# Patient Record
Sex: MALE | Race: WHITE | NOT HISPANIC OR LATINO | Employment: FULL TIME | URBAN - METROPOLITAN AREA
[De-identification: names, ages, dates, MRNs, and addresses within clinical notes are randomized per-mention and may not be internally consistent; named-entity substitution may affect disease eponyms.]

---

## 2017-12-14 ENCOUNTER — TELEPHONE (OUTPATIENT)
Dept: FAMILY MEDICINE | Facility: CLINIC | Age: 32
End: 2017-12-14

## 2017-12-14 RX ORDER — OSELTAMIVIR PHOSPHATE 75 MG/1
75 CAPSULE ORAL DAILY
Qty: 10 CAPSULE | Refills: 0 | Status: SHIPPED | OUTPATIENT
Start: 2017-12-14 | End: 2017-12-24

## 2018-03-09 ENCOUNTER — OFFICE VISIT (OUTPATIENT)
Dept: FAMILY MEDICINE | Facility: CLINIC | Age: 33
End: 2018-03-09
Payer: COMMERCIAL

## 2018-03-09 VITALS
OXYGEN SATURATION: 97 % | DIASTOLIC BLOOD PRESSURE: 80 MMHG | HEART RATE: 87 BPM | HEIGHT: 69 IN | BODY MASS INDEX: 40.88 KG/M2 | WEIGHT: 276 LBS | SYSTOLIC BLOOD PRESSURE: 130 MMHG | TEMPERATURE: 98 F

## 2018-03-09 DIAGNOSIS — J00 COMMON COLD: Primary | ICD-10-CM

## 2018-03-09 PROCEDURE — 99999 PR PBB SHADOW E&M-EST. PATIENT-LVL III: CPT | Mod: PBBFAC,,, | Performed by: FAMILY MEDICINE

## 2018-03-09 PROCEDURE — 99203 OFFICE O/P NEW LOW 30 MIN: CPT | Mod: S$GLB,,, | Performed by: FAMILY MEDICINE

## 2018-03-09 RX ORDER — BENZONATATE 200 MG/1
200 CAPSULE ORAL 3 TIMES DAILY PRN
Qty: 30 CAPSULE | Refills: 0 | Status: SHIPPED | OUTPATIENT
Start: 2018-03-09 | End: 2018-03-19

## 2018-03-09 RX ORDER — FLUTICASONE PROPIONATE 50 MCG
2 SPRAY, SUSPENSION (ML) NASAL DAILY
Qty: 1 BOTTLE | Refills: 0 | Status: SHIPPED | OUTPATIENT
Start: 2018-03-09 | End: 2019-02-25

## 2018-03-09 NOTE — PROGRESS NOTES
"Subjective:       Patient ID: Bj Balbuena Jr. is a 32 y.o. male.    Chief Complaint: Nasal Congestion    HPI   32 year old male comes in complaining that he thinks he has flu like symptoms. He started having nasal congestion last night with runny nose. Had a cough, but that has resolved, had some shortness of breath but that has also resolved. He felt achy yesterday, but not really today. He missed work yesterday and today. He works as a contractor. He tried Dayquil with some relief.     Review of Systems   Constitutional: Positive for fatigue. Negative for chills and fever.   HENT: Positive for congestion, postnasal drip, rhinorrhea and sore throat. Negative for ear discharge and sinus pressure.    Eyes: Negative for discharge.   Respiratory: Negative for cough, shortness of breath and wheezing.    Cardiovascular: Negative for palpitations.   Gastrointestinal: Negative for abdominal pain, blood in stool, constipation and diarrhea.   Genitourinary: Negative for dysuria.   Skin: Negative for rash.       Objective:     /80   Pulse 87   Temp 98.1 °F (36.7 °C) (Oral)   Ht 5' 9" (1.753 m)   Wt 125.2 kg (276 lb 0.3 oz)   SpO2 97%   BMI 40.76 kg/m²     Physical Exam   Constitutional: He appears well-developed. He appears ill. No distress.   HENT:   Head: Normocephalic and atraumatic.   Right Ear: Tympanic membrane, external ear and ear canal normal.   Left Ear: Tympanic membrane, external ear and ear canal normal.   Nose: Mucosal edema and rhinorrhea present.  No foreign bodies. Right sinus exhibits no maxillary sinus tenderness. Left sinus exhibits no maxillary sinus tenderness.   Mouth/Throat: Posterior oropharyngeal erythema present.   Eyes: Conjunctivae, EOM and lids are normal. Pupils are equal, round, and reactive to light. Right eye exhibits no discharge. Left eye exhibits no discharge. No scleral icterus.   Neck: Trachea normal and normal range of motion. Neck supple. No tracheal tenderness " present. No tracheal deviation present. No thyroid mass present.   Cardiovascular: Normal rate, regular rhythm, normal heart sounds and intact distal pulses.    Pulmonary/Chest: Effort normal and breath sounds normal. No respiratory distress. He has no wheezes. He has no rales.   Lymphadenopathy:     He has no cervical adenopathy.   Psychiatric: He has a normal mood and affect. His behavior is normal.   Vitals reviewed.      Assessment:       1. Common cold        Plan:     Bj was seen today for nasal congestion.    Diagnoses and all orders for this visit:    Common cold  -     fluticasone (FLONASE) 50 mcg/actuation nasal spray; 2 sprays (100 mcg total) by Each Nare route once daily.  -     benzonatate (TESSALON) 200 MG capsule; Take 1 capsule (200 mg total) by mouth 3 (three) times daily as needed for Cough.  -     POCT Influenza A/B    Flu test negative in office.   Advised rest, fluids, and OTC ibuprofen.  Flonase and Tessalon for symptom relief.  Explained that after infections like these, people commonly have postviral coughing for several weeks.

## 2019-02-25 ENCOUNTER — OFFICE VISIT (OUTPATIENT)
Dept: FAMILY MEDICINE | Facility: CLINIC | Age: 34
End: 2019-02-25
Payer: COMMERCIAL

## 2019-02-25 VITALS
SYSTOLIC BLOOD PRESSURE: 124 MMHG | HEIGHT: 69 IN | DIASTOLIC BLOOD PRESSURE: 72 MMHG | WEIGHT: 261.94 LBS | HEART RATE: 78 BPM | OXYGEN SATURATION: 99 % | BODY MASS INDEX: 38.8 KG/M2 | TEMPERATURE: 97 F

## 2019-02-25 DIAGNOSIS — Z00.00 ROUTINE HEALTH MAINTENANCE: ICD-10-CM

## 2019-02-25 DIAGNOSIS — Z23 NEED FOR PROPHYLACTIC VACCINATION WITH TETANUS-DIPHTHERIA (TD): ICD-10-CM

## 2019-02-25 DIAGNOSIS — J06.9 VIRAL URI: Primary | ICD-10-CM

## 2019-02-25 PROCEDURE — 99999 PR PBB SHADOW E&M-EST. PATIENT-LVL III: ICD-10-PCS | Mod: PBBFAC,,, | Performed by: NURSE PRACTITIONER

## 2019-02-25 PROCEDURE — 90471 IMMUNIZATION ADMIN: CPT | Mod: S$GLB,,, | Performed by: NURSE PRACTITIONER

## 2019-02-25 PROCEDURE — 3008F PR BODY MASS INDEX (BMI) DOCUMENTED: ICD-10-PCS | Mod: CPTII,S$GLB,, | Performed by: NURSE PRACTITIONER

## 2019-02-25 PROCEDURE — 99214 OFFICE O/P EST MOD 30 MIN: CPT | Mod: 25,S$GLB,, | Performed by: NURSE PRACTITIONER

## 2019-02-25 PROCEDURE — 90714 TD VACC NO PRESV 7 YRS+ IM: CPT | Mod: S$GLB,,, | Performed by: NURSE PRACTITIONER

## 2019-02-25 PROCEDURE — 99999 PR PBB SHADOW E&M-EST. PATIENT-LVL III: CPT | Mod: PBBFAC,,, | Performed by: NURSE PRACTITIONER

## 2019-02-25 PROCEDURE — 99214 PR OFFICE/OUTPT VISIT, EST, LEVL IV, 30-39 MIN: ICD-10-PCS | Mod: 25,S$GLB,, | Performed by: NURSE PRACTITIONER

## 2019-02-25 PROCEDURE — 90714 TD VACCINE GREATER THAN OR EQUAL TO 7YO PRESERVATIVE FREE IM: ICD-10-PCS | Mod: S$GLB,,, | Performed by: NURSE PRACTITIONER

## 2019-02-25 PROCEDURE — 90471 TD VACCINE GREATER THAN OR EQUAL TO 7YO PRESERVATIVE FREE IM: ICD-10-PCS | Mod: S$GLB,,, | Performed by: NURSE PRACTITIONER

## 2019-02-25 PROCEDURE — 3008F BODY MASS INDEX DOCD: CPT | Mod: CPTII,S$GLB,, | Performed by: NURSE PRACTITIONER

## 2019-02-25 NOTE — LETTER
February 25, 2019      [facility]  [address]  [place] [address]  Phone: [phone]  Fax: [phone]       Patient: Bj Balbuena   YOB: 1985  Date of Visit: 02/25/2019    To Whom It May Concern:    Yahaira Balbuena  was at [facility] on 02/25/2019. He may return to work on 2/26/2019 with no restrictions. If you have any questions or concerns, or if I can be of further assistance, please do not hesitate to contact me.    Sincerely,    Magali Azar, DNP

## 2019-02-25 NOTE — PROGRESS NOTES
Subjective:       Patient ID: Bj Balbuena Jr. is a 33 y.o. male.    Chief Complaint: Nasal Congestion    Chief Complaint   Patient presents with    Nasal Congestion       HPI  Bj Balbuena Jr. is a 33 y.o. male with multiple medical diagnoses as listed in the medical history and problem list that presents for nasal congestion.  This patient is new to me. He is being seen today with his wife, who has been diagnosed with a viral URI. He has been working overtime for the last several weeks due to a project at his workplace, he works in shifts some days/nights. He is frequently tired. This project is coming to a close soon and he will be able to resume a regular schedule. He has been experiencing some nasal congestion since this morning, but states that it very mild and not bothersome. He denies fever, chills, body aches, nausea or vomiting.      PAST MEDICAL HISTORY:  History reviewed. No pertinent past medical history.    PAST SURGICAL HISTORY:  History reviewed. No pertinent surgical history.    SOCIAL HISTORY:  Social History     Socioeconomic History    Marital status:      Spouse name: Not on file    Number of children: Not on file    Years of education: Not on file    Highest education level: Not on file   Social Needs    Financial resource strain: Not on file    Food insecurity - worry: Not on file    Food insecurity - inability: Not on file    Transportation needs - medical: Not on file    Transportation needs - non-medical: Not on file   Occupational History    Not on file   Tobacco Use    Smoking status: Never Smoker    Smokeless tobacco: Never Used   Substance and Sexual Activity    Alcohol use: Yes     Alcohol/week: 1.8 oz     Types: 3 Glasses of wine per week     Comment: 3x week    Drug use: No    Sexual activity: Not on file   Other Topics Concern    Not on file   Social History Narrative    Not on file       FAMILY HISTORY:  History reviewed. No pertinent family  "history.    ALLERGIES AND MEDICATIONS: updated and reviewed.  Review of patient's allergies indicates:  No Known Allergies  No current outpatient medications on file.     No current facility-administered medications for this visit.          ROS  Review of Systems   Constitutional: Negative for activity change, appetite change, chills and fever.   HENT: Positive for congestion (mild, started today).    Eyes: Negative for pain, discharge and itching.   Respiratory: Negative for cough, shortness of breath and wheezing.    Cardiovascular: Negative for chest pain.   Gastrointestinal: Negative for abdominal pain.   Genitourinary: Negative for dysuria.   Neurological: Negative for headaches.   Psychiatric/Behavioral: Negative for behavioral problems and confusion.           Objective:     Physical Exam  Vitals:    02/25/19 1115   BP: 124/72   Pulse: 78   Temp: 97.2 °F (36.2 °C)   TempSrc: Oral   SpO2: 99%   Weight: 118.8 kg (261 lb 14.5 oz)   Height: 5' 9" (1.753 m)    Body mass index is 38.68 kg/m².  Weight: 118.8 kg (261 lb 14.5 oz)   Height: 5' 9" (175.3 cm)   Physical Exam   Constitutional: He is oriented to person, place, and time. He appears well-developed and well-nourished.   HENT:   Head: Normocephalic and atraumatic.   Right Ear: Tympanic membrane and ear canal normal.   Left Ear: Tympanic membrane and ear canal normal.   Nose: Mucosal edema and rhinorrhea present. Right sinus exhibits no maxillary sinus tenderness and no frontal sinus tenderness. Left sinus exhibits no maxillary sinus tenderness and no frontal sinus tenderness.   Mouth/Throat: No oropharyngeal exudate, posterior oropharyngeal edema or posterior oropharyngeal erythema.   Eyes: Conjunctivae and EOM are normal.   Neck: Normal range of motion. Neck supple.   Cardiovascular: Normal rate.   Pulmonary/Chest: Effort normal.   Musculoskeletal: He exhibits no edema.   Lymphadenopathy:     He has no cervical adenopathy.   Neurological: He is alert and " oriented to person, place, and time. No cranial nerve deficit.   Skin: Skin is warm and dry. No rash noted.   Vitals reviewed.        Assessment:     1. Viral URI    2. Routine health maintenance    3. Need for prophylactic vaccination with tetanus-diphtheria (Td)      Plan:     Bj was seen today for nasal congestion.    Diagnoses and all orders for this visit:    Viral URI        -     Recommended symptomatic care, including sinus rinse, OTC multi-symptom cold medicine, rest and hydration    Routine health maintenance  -     Lipid panel; Future  -     CBC auto differential; Future  -     Comprehensive metabolic panel; Future  -     TSH; Future  -     Will follow for results    Need for prophylactic vaccination with tetanus-diphtheria (Td)  -     (In Office Administered) Td Vaccine - Preservative Free  -     Administered      Health Maintenance       Date Due Completion Date    Lipid Panel 1983 ---    TETANUS VACCINE 09/20/2001 ---    Influenza Vaccine 08/01/2018 ---          Health Maintenance reviewed, see orders.    Follow-up: Follow-up in about 1 month (around 3/25/2019), or if symptoms worsen or fail to improve, for annual wellness visit.    The patient expressed understanding and no barriers to adherence were identified.     1. The patient indicates understanding of these issues and agrees with the plan. Brief care plan is updated and reviewed with the patient as applicable.     2. The patient is given an After Visit Summary that lists all medications with directions, allergies, orders placed during this encounter and follow-up instructions.     3. I have reviewed the patient's medical information including past medical, family, and social history sections including the medications and allergies.     4. We discussed the patient's current medications. I reconciled the patient's medication list and prepared and supplied needed refills.     Magali Azar, DNP, APRN, FNP-c  Family  Medicine    My collaborating physicians are Dr. Nico Liang and Dr. Ale Denise

## 2019-02-25 NOTE — PATIENT INSTRUCTIONS
Viral Upper Respiratory Illness (Adult)  You have a viral upper respiratory illness (URI), which is another term for the common cold. This illness is contagious during the first few days. It is spread through the air by coughing and sneezing. It may also be spread by direct contact (touching the sick person and then touching your own eyes, nose, or mouth). Frequent handwashing will decrease risk of spread. Most viral illnesses go away within 7 to 10 days with rest and simple home remedies. Sometimes the illness may last for several weeks. Antibiotics will not kill a virus, and they are generally not prescribed for this condition.    Home care  · If symptoms are severe, rest at home for the first 2 to 3 days. When you resume activity, don't let yourself get too tired.  · Avoid being exposed to cigarette smoke (yours or others).  · You may use acetaminophen or ibuprofen to control pain and fever, unless another medicine was prescribed. (Note: If you have chronic liver or kidney disease, have ever had a stomach ulcer or gastrointestinal bleeding, or are taking blood-thinning medicines, talk with your healthcare provider before using these medicines.) Aspirin should never be given to anyone under 18 years of age who is ill with a viral infection or fever. It may cause severe liver or brain damage.  · Your appetite may be poor, so a light diet is fine. Avoid dehydration by drinking 6 to 8 glasses of fluids per day (water, soft drinks, juices, tea, or soup). Extra fluids will help loosen secretions in the nose and lungs.  · Over-the-counter cold medicines will not shorten the length of time youre sick, but they may be helpful for the following symptoms: cough, sore throat, and nasal and sinus congestion. (Note: Do not use decongestants if you have high blood pressure.)  Follow-up care  Follow up with your healthcare provider, or as advised.  When to seek medical advice  Call your healthcare provider right away if any  of these occur:  · Cough with lots of colored sputum (mucus)  · Severe headache; face, neck, or ear pain  · Difficulty swallowing due to throat pain  · Fever of 100.4°F (38°C)  Call 911, or get immediate medical care  Call emergency services right away if any of these occur:  · Chest pain, shortness of breath, wheezing, or difficulty breathing  · Coughing up blood  · Inability to swallow due to throat pain  Date Last Reviewed: 9/13/2015  © 8776-1014 [employer] [address] [place] [address]. All rights reserved. This information is not intended as a substitute for professional medical care. Always follow your healthcare professional's instructions.

## 2019-03-25 ENCOUNTER — LAB VISIT (OUTPATIENT)
Dept: LAB | Facility: HOSPITAL | Age: 34
End: 2019-03-25
Attending: NURSE PRACTITIONER
Payer: COMMERCIAL

## 2019-03-25 DIAGNOSIS — Z00.00 ROUTINE HEALTH MAINTENANCE: ICD-10-CM

## 2019-03-25 LAB
ALBUMIN SERPL BCP-MCNC: 4 G/DL (ref 3.5–5.2)
ALP SERPL-CCNC: 74 U/L (ref 55–135)
ALT SERPL W/O P-5'-P-CCNC: 29 U/L (ref 10–44)
ANION GAP SERPL CALC-SCNC: 7 MMOL/L (ref 8–16)
AST SERPL-CCNC: 20 U/L (ref 10–40)
BASOPHILS # BLD AUTO: 0.05 K/UL (ref 0–0.2)
BASOPHILS NFR BLD: 0.6 % (ref 0–1.9)
BILIRUB SERPL-MCNC: 0.7 MG/DL (ref 0.1–1)
BUN SERPL-MCNC: 12 MG/DL (ref 6–20)
CALCIUM SERPL-MCNC: 9.5 MG/DL (ref 8.7–10.5)
CHLORIDE SERPL-SCNC: 104 MMOL/L (ref 95–110)
CHOLEST SERPL-MCNC: 177 MG/DL (ref 120–199)
CHOLEST/HDLC SERPL: 4.3 {RATIO} (ref 2–5)
CO2 SERPL-SCNC: 27 MMOL/L (ref 23–29)
CREAT SERPL-MCNC: 1 MG/DL (ref 0.5–1.4)
DIFFERENTIAL METHOD: NORMAL
EOSINOPHIL # BLD AUTO: 0.1 K/UL (ref 0–0.5)
EOSINOPHIL NFR BLD: 1.4 % (ref 0–8)
ERYTHROCYTE [DISTWIDTH] IN BLOOD BY AUTOMATED COUNT: 13 % (ref 11.5–14.5)
EST. GFR  (AFRICAN AMERICAN): >60 ML/MIN/1.73 M^2
EST. GFR  (NON AFRICAN AMERICAN): >60 ML/MIN/1.73 M^2
GLUCOSE SERPL-MCNC: 98 MG/DL (ref 70–110)
HCT VFR BLD AUTO: 42.7 % (ref 40–54)
HDLC SERPL-MCNC: 41 MG/DL (ref 40–75)
HDLC SERPL: 23.2 % (ref 20–50)
HGB BLD-MCNC: 14.6 G/DL (ref 14–18)
IMM GRANULOCYTES # BLD AUTO: 0.04 K/UL (ref 0–0.04)
IMM GRANULOCYTES NFR BLD AUTO: 0.5 % (ref 0–0.5)
LDLC SERPL CALC-MCNC: 93.2 MG/DL (ref 63–159)
LYMPHOCYTES # BLD AUTO: 2.2 K/UL (ref 1–4.8)
LYMPHOCYTES NFR BLD: 25.7 % (ref 18–48)
MCH RBC QN AUTO: 28.2 PG (ref 27–31)
MCHC RBC AUTO-ENTMCNC: 34.2 G/DL (ref 32–36)
MCV RBC AUTO: 83 FL (ref 82–98)
MONOCYTES # BLD AUTO: 0.7 K/UL (ref 0.3–1)
MONOCYTES NFR BLD: 8.3 % (ref 4–15)
NEUTROPHILS # BLD AUTO: 5.4 K/UL (ref 1.8–7.7)
NEUTROPHILS NFR BLD: 63.5 % (ref 38–73)
NONHDLC SERPL-MCNC: 136 MG/DL
NRBC BLD-RTO: 0 /100 WBC
PLATELET # BLD AUTO: 240 K/UL (ref 150–350)
PMV BLD AUTO: 11.6 FL (ref 9.2–12.9)
POTASSIUM SERPL-SCNC: 4 MMOL/L (ref 3.5–5.1)
PROT SERPL-MCNC: 7.3 G/DL (ref 6–8.4)
RBC # BLD AUTO: 5.17 M/UL (ref 4.6–6.2)
SODIUM SERPL-SCNC: 138 MMOL/L (ref 136–145)
TRIGL SERPL-MCNC: 214 MG/DL (ref 30–150)
TSH SERPL DL<=0.005 MIU/L-ACNC: 1.73 UIU/ML (ref 0.4–4)
WBC # BLD AUTO: 8.46 K/UL (ref 3.9–12.7)

## 2019-03-25 PROCEDURE — 85025 COMPLETE CBC W/AUTO DIFF WBC: CPT

## 2019-03-25 PROCEDURE — 84443 ASSAY THYROID STIM HORMONE: CPT

## 2019-03-25 PROCEDURE — 36415 COLL VENOUS BLD VENIPUNCTURE: CPT | Mod: PO

## 2019-03-25 PROCEDURE — 80053 COMPREHEN METABOLIC PANEL: CPT

## 2019-03-25 PROCEDURE — 80061 LIPID PANEL: CPT

## 2019-05-28 ENCOUNTER — LAB VISIT (OUTPATIENT)
Dept: LAB | Facility: HOSPITAL | Age: 34
End: 2019-05-28
Payer: COMMERCIAL

## 2019-05-28 ENCOUNTER — OFFICE VISIT (OUTPATIENT)
Dept: FAMILY MEDICINE | Facility: CLINIC | Age: 34
End: 2019-05-28
Payer: COMMERCIAL

## 2019-05-28 VITALS
HEIGHT: 69 IN | HEART RATE: 80 BPM | BODY MASS INDEX: 38.93 KG/M2 | OXYGEN SATURATION: 97 % | SYSTOLIC BLOOD PRESSURE: 116 MMHG | WEIGHT: 262.81 LBS | TEMPERATURE: 98 F | DIASTOLIC BLOOD PRESSURE: 68 MMHG

## 2019-05-28 DIAGNOSIS — R53.83 FATIGUE, UNSPECIFIED TYPE: Primary | ICD-10-CM

## 2019-05-28 DIAGNOSIS — R53.83 FATIGUE, UNSPECIFIED TYPE: ICD-10-CM

## 2019-05-28 LAB
25(OH)D3+25(OH)D2 SERPL-MCNC: 25 NG/ML (ref 30–96)
ALBUMIN SERPL BCP-MCNC: 4 G/DL (ref 3.5–5.2)
ALP SERPL-CCNC: 79 U/L (ref 55–135)
ALT SERPL W/O P-5'-P-CCNC: 25 U/L (ref 10–44)
ANION GAP SERPL CALC-SCNC: 9 MMOL/L (ref 8–16)
AST SERPL-CCNC: 18 U/L (ref 10–40)
BASOPHILS # BLD AUTO: 0.06 K/UL (ref 0–0.2)
BASOPHILS NFR BLD: 0.8 % (ref 0–1.9)
BILIRUB SERPL-MCNC: 0.4 MG/DL (ref 0.1–1)
BUN SERPL-MCNC: 11 MG/DL (ref 6–20)
CALCIUM SERPL-MCNC: 9.6 MG/DL (ref 8.7–10.5)
CHLORIDE SERPL-SCNC: 107 MMOL/L (ref 95–110)
CK SERPL-CCNC: 100 U/L (ref 20–200)
CO2 SERPL-SCNC: 25 MMOL/L (ref 23–29)
CREAT SERPL-MCNC: 1 MG/DL (ref 0.5–1.4)
DIFFERENTIAL METHOD: NORMAL
EOSINOPHIL # BLD AUTO: 0.1 K/UL (ref 0–0.5)
EOSINOPHIL NFR BLD: 1.1 % (ref 0–8)
ERYTHROCYTE [DISTWIDTH] IN BLOOD BY AUTOMATED COUNT: 12.8 % (ref 11.5–14.5)
EST. GFR  (AFRICAN AMERICAN): >60 ML/MIN/1.73 M^2
EST. GFR  (NON AFRICAN AMERICAN): >60 ML/MIN/1.73 M^2
FERRITIN SERPL-MCNC: 245 NG/ML (ref 20–300)
FOLATE SERPL-MCNC: 6.3 NG/ML (ref 4–24)
GLUCOSE SERPL-MCNC: 113 MG/DL (ref 70–110)
HCT VFR BLD AUTO: 45.1 % (ref 40–54)
HGB BLD-MCNC: 15.1 G/DL (ref 14–18)
IMM GRANULOCYTES # BLD AUTO: 0.02 K/UL (ref 0–0.04)
IMM GRANULOCYTES NFR BLD AUTO: 0.3 % (ref 0–0.5)
IRON SERPL-MCNC: 70 UG/DL (ref 45–160)
LYMPHOCYTES # BLD AUTO: 1.9 K/UL (ref 1–4.8)
LYMPHOCYTES NFR BLD: 25.4 % (ref 18–48)
MCH RBC QN AUTO: 28.1 PG (ref 27–31)
MCHC RBC AUTO-ENTMCNC: 33.5 G/DL (ref 32–36)
MCV RBC AUTO: 84 FL (ref 82–98)
MONOCYTES # BLD AUTO: 0.5 K/UL (ref 0.3–1)
MONOCYTES NFR BLD: 7 % (ref 4–15)
NEUTROPHILS # BLD AUTO: 4.9 K/UL (ref 1.8–7.7)
NEUTROPHILS NFR BLD: 65.4 % (ref 38–73)
NRBC BLD-RTO: 0 /100 WBC
PLATELET # BLD AUTO: 225 K/UL (ref 150–350)
PMV BLD AUTO: 11.8 FL (ref 9.2–12.9)
POTASSIUM SERPL-SCNC: 4.5 MMOL/L (ref 3.5–5.1)
PROT SERPL-MCNC: 7.5 G/DL (ref 6–8.4)
RBC # BLD AUTO: 5.38 M/UL (ref 4.6–6.2)
SATURATED IRON: 22 % (ref 20–50)
SODIUM SERPL-SCNC: 141 MMOL/L (ref 136–145)
TOTAL IRON BINDING CAPACITY: 312 UG/DL (ref 250–450)
TRANSFERRIN SERPL-MCNC: 211 MG/DL (ref 200–375)
TSH SERPL DL<=0.005 MIU/L-ACNC: 1.71 UIU/ML (ref 0.4–4)
VIT B12 SERPL-MCNC: 300 PG/ML (ref 210–950)
WBC # BLD AUTO: 7.47 K/UL (ref 3.9–12.7)

## 2019-05-28 PROCEDURE — 80053 COMPREHEN METABOLIC PANEL: CPT

## 2019-05-28 PROCEDURE — 85025 COMPLETE CBC W/AUTO DIFF WBC: CPT

## 2019-05-28 PROCEDURE — 82550 ASSAY OF CK (CPK): CPT

## 2019-05-28 PROCEDURE — 99214 OFFICE O/P EST MOD 30 MIN: CPT | Mod: S$GLB,,, | Performed by: NURSE PRACTITIONER

## 2019-05-28 PROCEDURE — 3008F PR BODY MASS INDEX (BMI) DOCUMENTED: ICD-10-PCS | Mod: CPTII,S$GLB,, | Performed by: NURSE PRACTITIONER

## 2019-05-28 PROCEDURE — 82306 VITAMIN D 25 HYDROXY: CPT

## 2019-05-28 PROCEDURE — 82746 ASSAY OF FOLIC ACID SERUM: CPT

## 2019-05-28 PROCEDURE — 36415 COLL VENOUS BLD VENIPUNCTURE: CPT | Mod: PO

## 2019-05-28 PROCEDURE — 99999 PR PBB SHADOW E&M-EST. PATIENT-LVL III: ICD-10-PCS | Mod: PBBFAC,,, | Performed by: NURSE PRACTITIONER

## 2019-05-28 PROCEDURE — 83540 ASSAY OF IRON: CPT

## 2019-05-28 PROCEDURE — 99214 PR OFFICE/OUTPT VISIT, EST, LEVL IV, 30-39 MIN: ICD-10-PCS | Mod: S$GLB,,, | Performed by: NURSE PRACTITIONER

## 2019-05-28 PROCEDURE — 3008F BODY MASS INDEX DOCD: CPT | Mod: CPTII,S$GLB,, | Performed by: NURSE PRACTITIONER

## 2019-05-28 PROCEDURE — 82607 VITAMIN B-12: CPT

## 2019-05-28 PROCEDURE — 84443 ASSAY THYROID STIM HORMONE: CPT

## 2019-05-28 PROCEDURE — 82728 ASSAY OF FERRITIN: CPT

## 2019-05-28 PROCEDURE — 99999 PR PBB SHADOW E&M-EST. PATIENT-LVL III: CPT | Mod: PBBFAC,,, | Performed by: NURSE PRACTITIONER

## 2019-05-28 NOTE — PROGRESS NOTES
Subjective:       Patient ID: Bj Balbuena Jr. is a 33 y.o. male.    Chief Complaint: Fatigue (2 months)    33-year-old male presents to the clinic today with fatigue for the last 2 months that has gotten worse over the last week.  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  He states he drinks about a quarts of water per day.  He denies any muscle or joint pain. He states he sweats a lot at work.  He has no family history of diabetes.  He denies any cardiac chest pain, heart palpitations, shortness of breath, or swelling to lower extremities.  He denies any headaches, dizziness, or blurred vision.    History reviewed. No pertinent past medical history.  History reviewed. No pertinent surgical history.   reports that he has never smoked. He has never used smokeless tobacco. He reports that he drinks about 1.8 oz of alcohol per week. He reports that he does not use drugs.  Review of Systems   Constitutional: Positive for fatigue. Negative for activity change.   Respiratory: Negative for cough, chest tightness, shortness of breath and wheezing.    Cardiovascular: Negative for chest pain, palpitations and leg swelling.   Gastrointestinal: Negative for abdominal pain, diarrhea, nausea and vomiting.   Musculoskeletal: Negative for back pain and gait problem.   Skin: Negative for color change.   Neurological: Negative for dizziness, light-headedness and headaches.       Objective:      Physical Exam   Constitutional: He is oriented to person, place, and time. He appears well-developed and well-nourished. No distress.   HENT:   Head: Normocephalic and atraumatic.   Right Ear: External ear normal.   Left Ear: External ear normal.   Nose: Nose normal.   Mouth/Throat: Oropharynx is clear and moist. No oropharyngeal exudate.   Eyes: Pupils are equal, round, and reactive to light. Conjunctivae and EOM are normal. Right eye exhibits no discharge. Left eye exhibits no discharge. No  scleral icterus.   Neck: Normal range of motion. Neck supple.   Cardiovascular: Normal rate and regular rhythm. Exam reveals no gallop and no friction rub.   No murmur heard.  Pulmonary/Chest: Effort normal and breath sounds normal. No respiratory distress. He has no wheezes. He has no rales.   Abdominal: Soft. Bowel sounds are normal. There is no tenderness.   Musculoskeletal: Normal range of motion. He exhibits no edema.   Lymphadenopathy:     He has no cervical adenopathy.   Neurological: He is alert and oriented to person, place, and time.   Skin: Skin is warm and dry. No rash noted. He is not diaphoretic.   Psychiatric: He has a normal mood and affect.       Assessment:       1. Fatigue, unspecified type        Plan:         Fatigue, unspecified type  -     CBC auto differential; Future; Expected date: 05/28/2019  -     Comprehensive metabolic panel; Future; Expected date: 05/28/2019  -     TSH; Future; Expected date: 05/28/2019  -     Vitamin D; Future; Expected date: 05/28/2019  -     CK; Future; Expected date: 05/28/2019  -     Vitamin B12; Future; Expected date: 05/28/2019  -     Iron and TIBC; Future; Expected date: 05/28/2019  -     Ferritin; Future; Expected date: 05/28/2019  -     Folate; Future; Expected date: 05/28/2019

## 2019-05-28 NOTE — LETTER
May 28, 2019      [facility]  [address]  [place] [address]  Phone: [phone]  Fax: [phone]       Patient: Bj Balbuena   YOB: 1985  Date of Visit: 05/28/2019    To Whom It May Concern:    Yahaira Balbuena  was at [facility] on 05/28/2019. He may return to work/school on 5/29/2019 with light duty restrictions for one day. If you have any questions or concerns, or if I can be of further assistance, please do not hesitate to contact me.    Sincerely,      Rosa Almodovar, ASTER-C

## 2019-05-29 ENCOUNTER — TELEPHONE (OUTPATIENT)
Dept: FAMILY MEDICINE | Facility: CLINIC | Age: 34
End: 2019-05-29

## 2019-05-29 NOTE — TELEPHONE ENCOUNTER
----- Message from Mamta Cullen sent at 5/29/2019 10:14 AM CDT -----  Contact: self [phone]  .Type:  Patient Returning Call    Who Called: self     Who Left Message for Patient: Monica Murrieta    Does the patient know what this is regarding?: results    Would the patient rather a call back or a response via [facility]? Call back     Best Call Back Number: [phone]

## 2019-05-29 NOTE — TELEPHONE ENCOUNTER
I spoke with the patient and explained that his vitamin d level was slightly low and his glucose was 113 otherwise everything was completely normal. I recommended he take Vitamin D 2,000 IU daily. He said he felt bad today at work and had to go home and he said something has to be wrong. I told him that I could schedule him a appointment with Dr. PHAM Maldonado tomorrow for further evaluation. He agreed to do that.

## 2019-05-29 NOTE — TELEPHONE ENCOUNTER
I left a message on the patient's voice mail to return a call to the office to discuss his lab results.

## 2019-05-30 ENCOUNTER — OFFICE VISIT (OUTPATIENT)
Dept: FAMILY MEDICINE | Facility: CLINIC | Age: 34
End: 2019-05-30
Payer: COMMERCIAL

## 2019-05-30 VITALS
BODY MASS INDEX: 39.23 KG/M2 | DIASTOLIC BLOOD PRESSURE: 86 MMHG | WEIGHT: 264.88 LBS | OXYGEN SATURATION: 97 % | SYSTOLIC BLOOD PRESSURE: 126 MMHG | TEMPERATURE: 98 F | HEIGHT: 69 IN | HEART RATE: 80 BPM

## 2019-05-30 DIAGNOSIS — R06.83 SNORING: Primary | ICD-10-CM

## 2019-05-30 PROCEDURE — 99999 PR PBB SHADOW E&M-EST. PATIENT-LVL III: CPT | Mod: PBBFAC,,, | Performed by: FAMILY MEDICINE

## 2019-05-30 PROCEDURE — 99214 PR OFFICE/OUTPT VISIT, EST, LEVL IV, 30-39 MIN: ICD-10-PCS | Mod: S$GLB,,, | Performed by: FAMILY MEDICINE

## 2019-05-30 PROCEDURE — 99999 PR PBB SHADOW E&M-EST. PATIENT-LVL III: ICD-10-PCS | Mod: PBBFAC,,, | Performed by: FAMILY MEDICINE

## 2019-05-30 PROCEDURE — 3008F BODY MASS INDEX DOCD: CPT | Mod: CPTII,S$GLB,, | Performed by: FAMILY MEDICINE

## 2019-05-30 PROCEDURE — 99214 OFFICE O/P EST MOD 30 MIN: CPT | Mod: S$GLB,,, | Performed by: FAMILY MEDICINE

## 2019-05-30 PROCEDURE — 3008F PR BODY MASS INDEX (BMI) DOCUMENTED: ICD-10-PCS | Mod: CPTII,S$GLB,, | Performed by: FAMILY MEDICINE

## 2019-05-30 NOTE — LETTER
May 30, 2019      [facility]  [address]  [place] [address]  Phone: [phone]  Fax: [phone]       Patient: Bj Balbuena   YOB: 1985  Date of Visit: 05/30/2019    To Whom It May Concern:    Yahaira Balbuena  was at [facility] on 05/30/2019. He may return to work on 06/03/2019 no restrictions. If you have any questions or concerns, or if I can be of further assistance, please do not hesitate to contact me.    Sincerely,          Alexis S. Barthelemy, MA

## 2019-05-30 NOTE — PROGRESS NOTES
"[facility]  Progress Note    SUBJECTIVE:     Chief Complaint   Patient presents with    Heat Exposure       HPI   Bj Balbuena Jr.  is a 33 y.o. male here for c/o having increasing heat intolerance. 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 Tries to drink plenty of water, but for the past couple weeks, has noticed that he is having trouble enduring the temperate. He does sweat significantly. When he feels "bad", has to take a break to sit in his car for A/C. He feels like he's having trouble cooling down. Today, feeling normal, back at baseline.     Review of patient's allergies indicates:  No Known Allergies    History reviewed. No pertinent past medical history.  History reviewed. No pertinent surgical history.  History reviewed. No pertinent family history.  Social History     Tobacco Use    Smoking status: Current Every Day Smoker     Types: Vaping w/o nicotine    Smokeless tobacco: Never Used   Substance Use Topics    Alcohol use: Yes     Alcohol/week: 1.8 oz     Types: 3 Glasses of wine per week     Comment: 3x week    Drug use: No        Review of Systems   Constitutional: Negative for chills, fever and malaise/fatigue.   HENT: Negative.    Respiratory: Negative.  Negative for cough and shortness of breath.    Cardiovascular: Negative.  Negative for chest pain.   Gastrointestinal: Negative.  Negative for abdominal pain, nausea and vomiting.   Genitourinary: Negative.    Neurological: Negative for weakness and headaches.   All other systems reviewed and are negative.    OBJECTIVE:     Vitals:    05/30/19 1106   BP: 126/86   Pulse: 80   Temp: 97.9 °F (36.6 °C)     Body mass index is 39.12 kg/m².    Physical Exam   Constitutional: He is oriented to person, place, and time and well-developed, well-nourished, and in no distress. No distress.   HENT:   Head: Normocephalic and atraumatic.   Nose: Nose normal.   Eyes: Conjunctivae and EOM are normal.   Cardiovascular: Normal rate, " regular rhythm and normal heart sounds. Exam reveals no gallop and no friction rub.   No murmur heard.  Pulmonary/Chest: Effort normal and breath sounds normal. No respiratory distress. He has no wheezes. He has no rales. He exhibits no tenderness.   Abdominal: Soft. Bowel sounds are normal. He exhibits no distension. There is no tenderness. There is no rebound.   Neurological: He is alert and oriented to person, place, and time.   Skin: Skin is warm. He is not diaphoretic.       Old records were reviewed. Labs and/or images were independently reviewed.    ASSESSMENT     1. Snoring        PLAN:     Snoring  -     Ambulatory referral to Sleep Disorders for possible sleep study. Discussed importance of staying hydrated in the heat weather, and to monitor for signs/symptoms of heat exhaustion/stroke as discussed.       RTC PRN  More than 50% of the encounter was spent counseling patient about heat exhaustion, in an outpatient setting. Total time spent counseling patient: 25.      Jim Maldonado MD  05/30/2019 12:45 PM

## 2019-06-06 ENCOUNTER — TELEPHONE (OUTPATIENT)
Dept: ADMINISTRATIVE | Facility: HOSPITAL | Age: 34
End: 2019-06-06

## 2019-07-07 ENCOUNTER — PATIENT OUTREACH (OUTPATIENT)
Dept: ADMINISTRATIVE | Facility: OTHER | Age: 34
End: 2019-07-07

## 2019-07-18 ENCOUNTER — TELEPHONE (OUTPATIENT)
Dept: PULMONOLOGY | Facility: CLINIC | Age: 34
End: 2019-07-18

## 2020-08-14 DIAGNOSIS — Z11.59 NEED FOR HEPATITIS C SCREENING TEST: ICD-10-CM

## 2020-09-30 ENCOUNTER — LAB VISIT (OUTPATIENT)
Dept: LAB | Facility: HOSPITAL | Age: 35
End: 2020-09-30
Attending: INTERNAL MEDICINE
Payer: COMMERCIAL

## 2020-09-30 ENCOUNTER — OFFICE VISIT (OUTPATIENT)
Dept: FAMILY MEDICINE | Facility: CLINIC | Age: 35
End: 2020-09-30
Payer: COMMERCIAL

## 2020-09-30 VITALS
DIASTOLIC BLOOD PRESSURE: 86 MMHG | TEMPERATURE: 100 F | HEIGHT: 69 IN | SYSTOLIC BLOOD PRESSURE: 132 MMHG | HEART RATE: 102 BPM | OXYGEN SATURATION: 98 % | WEIGHT: 304.56 LBS | BODY MASS INDEX: 45.11 KG/M2

## 2020-09-30 DIAGNOSIS — R20.0 RIGHT ARM NUMBNESS: ICD-10-CM

## 2020-09-30 DIAGNOSIS — Z13.1 SCREENING FOR DIABETES MELLITUS (DM): ICD-10-CM

## 2020-09-30 DIAGNOSIS — Z13.220 SCREENING FOR LIPID DISORDERS: ICD-10-CM

## 2020-09-30 DIAGNOSIS — G56.01 CARPAL TUNNEL SYNDROME OF RIGHT WRIST: Primary | ICD-10-CM

## 2020-09-30 DIAGNOSIS — G89.29 CHRONIC BILATERAL LOW BACK PAIN WITHOUT SCIATICA: ICD-10-CM

## 2020-09-30 DIAGNOSIS — E66.01 OBESITY, CLASS III, BMI 40-49.9 (MORBID OBESITY): ICD-10-CM

## 2020-09-30 DIAGNOSIS — M79.89 SWELLING OF LOWER EXTREMITY: ICD-10-CM

## 2020-09-30 DIAGNOSIS — M54.50 CHRONIC BILATERAL LOW BACK PAIN WITHOUT SCIATICA: ICD-10-CM

## 2020-09-30 PROBLEM — E66.813 OBESITY, CLASS III, BMI 40-49.9 (MORBID OBESITY): Status: ACTIVE | Noted: 2020-09-30

## 2020-09-30 LAB
ALBUMIN SERPL BCP-MCNC: 4.3 G/DL (ref 3.5–5.2)
ALP SERPL-CCNC: 82 U/L (ref 55–135)
ALT SERPL W/O P-5'-P-CCNC: 36 U/L (ref 10–44)
ANION GAP SERPL CALC-SCNC: 10 MMOL/L (ref 8–16)
AST SERPL-CCNC: 23 U/L (ref 10–40)
BASOPHILS # BLD AUTO: 0.05 K/UL (ref 0–0.2)
BASOPHILS NFR BLD: 0.5 % (ref 0–1.9)
BILIRUB SERPL-MCNC: 0.4 MG/DL (ref 0.1–1)
BUN SERPL-MCNC: 10 MG/DL (ref 6–20)
CALCIUM SERPL-MCNC: 9.7 MG/DL (ref 8.7–10.5)
CHLORIDE SERPL-SCNC: 103 MMOL/L (ref 95–110)
CHOLEST SERPL-MCNC: 187 MG/DL (ref 120–199)
CHOLEST/HDLC SERPL: 4.5 {RATIO} (ref 2–5)
CO2 SERPL-SCNC: 24 MMOL/L (ref 23–29)
CREAT SERPL-MCNC: 1 MG/DL (ref 0.5–1.4)
DIFFERENTIAL METHOD: ABNORMAL
EOSINOPHIL # BLD AUTO: 0.1 K/UL (ref 0–0.5)
EOSINOPHIL NFR BLD: 1 % (ref 0–8)
ERYTHROCYTE [DISTWIDTH] IN BLOOD BY AUTOMATED COUNT: 12.9 % (ref 11.5–14.5)
EST. GFR  (AFRICAN AMERICAN): >60 ML/MIN/1.73 M^2
EST. GFR  (NON AFRICAN AMERICAN): >60 ML/MIN/1.73 M^2
ESTIMATED AVG GLUCOSE: 117 MG/DL (ref 68–131)
GLUCOSE SERPL-MCNC: 115 MG/DL (ref 70–110)
HBA1C MFR BLD HPLC: 5.7 % (ref 4–5.6)
HCT VFR BLD AUTO: 45.7 % (ref 40–54)
HDLC SERPL-MCNC: 42 MG/DL (ref 40–75)
HDLC SERPL: 22.5 % (ref 20–50)
HGB BLD-MCNC: 14.7 G/DL (ref 14–18)
IMM GRANULOCYTES # BLD AUTO: 0.09 K/UL (ref 0–0.04)
IMM GRANULOCYTES NFR BLD AUTO: 0.9 % (ref 0–0.5)
LDLC SERPL CALC-MCNC: 102.8 MG/DL (ref 63–159)
LYMPHOCYTES # BLD AUTO: 2 K/UL (ref 1–4.8)
LYMPHOCYTES NFR BLD: 20 % (ref 18–48)
MCH RBC QN AUTO: 28.2 PG (ref 27–31)
MCHC RBC AUTO-ENTMCNC: 32.2 G/DL (ref 32–36)
MCV RBC AUTO: 88 FL (ref 82–98)
MONOCYTES # BLD AUTO: 0.6 K/UL (ref 0.3–1)
MONOCYTES NFR BLD: 6 % (ref 4–15)
NEUTROPHILS # BLD AUTO: 7.2 K/UL (ref 1.8–7.7)
NEUTROPHILS NFR BLD: 71.6 % (ref 38–73)
NONHDLC SERPL-MCNC: 145 MG/DL
NRBC BLD-RTO: 0 /100 WBC
PLATELET # BLD AUTO: 249 K/UL (ref 150–350)
PMV BLD AUTO: 11.7 FL (ref 9.2–12.9)
POTASSIUM SERPL-SCNC: 4.2 MMOL/L (ref 3.5–5.1)
PROT SERPL-MCNC: 8.1 G/DL (ref 6–8.4)
RBC # BLD AUTO: 5.22 M/UL (ref 4.6–6.2)
SODIUM SERPL-SCNC: 137 MMOL/L (ref 136–145)
TRIGL SERPL-MCNC: 211 MG/DL (ref 30–150)
WBC # BLD AUTO: 10.09 K/UL (ref 3.9–12.7)

## 2020-09-30 PROCEDURE — 99999 PR PBB SHADOW E&M-EST. PATIENT-LVL IV: CPT | Mod: PBBFAC,,, | Performed by: INTERNAL MEDICINE

## 2020-09-30 PROCEDURE — 99214 OFFICE O/P EST MOD 30 MIN: CPT | Mod: S$GLB,,, | Performed by: INTERNAL MEDICINE

## 2020-09-30 PROCEDURE — 3008F PR BODY MASS INDEX (BMI) DOCUMENTED: ICD-10-PCS | Mod: CPTII,S$GLB,, | Performed by: INTERNAL MEDICINE

## 2020-09-30 PROCEDURE — 83036 HEMOGLOBIN GLYCOSYLATED A1C: CPT

## 2020-09-30 PROCEDURE — 36415 COLL VENOUS BLD VENIPUNCTURE: CPT | Mod: PO

## 2020-09-30 PROCEDURE — 99999 PR PBB SHADOW E&M-EST. PATIENT-LVL IV: ICD-10-PCS | Mod: PBBFAC,,, | Performed by: INTERNAL MEDICINE

## 2020-09-30 PROCEDURE — 80053 COMPREHEN METABOLIC PANEL: CPT

## 2020-09-30 PROCEDURE — 80061 LIPID PANEL: CPT

## 2020-09-30 PROCEDURE — 99214 PR OFFICE/OUTPT VISIT, EST, LEVL IV, 30-39 MIN: ICD-10-PCS | Mod: S$GLB,,, | Performed by: INTERNAL MEDICINE

## 2020-09-30 PROCEDURE — 85025 COMPLETE CBC W/AUTO DIFF WBC: CPT

## 2020-09-30 PROCEDURE — 3008F BODY MASS INDEX DOCD: CPT | Mod: CPTII,S$GLB,, | Performed by: INTERNAL MEDICINE

## 2020-09-30 NOTE — PROGRESS NOTES
Subjective:       Chief Complaint  Chief Complaint   Patient presents with    Back Pain     x 10 years     numbness in rt arm       HPI  Bj Balbuena Jr. is a 35 y.o. male with multiple medical diagnoses as listed in the medical history and problem list that presents for above complaint(s).    Back Pain  Associated symptoms include numbness.   patient works as a  - has been furloughed since earlier this year due to pandemic  Pain in his back for some time - Notices that he had to take breaks between activity for projects and pain located predominantly in the lumbar spine  previousyl had been referred to a Pain Management in [place] - determined that he had a problem between his sciatic nerve and lumbar disc  Has had a few cortisone injections  Symptoms are frequent   Not taking anything for pain  Gabapentin used previously and caused severe side effects  No leg symptoms    Additionally he has noticed some numbness in his right hand   No neck pain symptoms presently     Patient Care Team:  Stevie Saldana MD as PCP - General (Internal Medicine)  Kami Valera MA as Care Coordinator      PAST MEDICAL HISTORY:  History reviewed. No pertinent past medical history.    PAST SURGICAL HISTORY:  History reviewed. No pertinent surgical history.    SOCIAL HISTORY:  Social History     Socioeconomic History    Marital status:      Spouse name: Not on file    Number of children: Not on file    Years of education: Not on file    Highest education level: Not on file   Occupational History    Not on file   Social Needs    Financial resource strain: Not on file    Food insecurity     Worry: Not on file     Inability: Not on file    Transportation needs     Medical: Not on file     Non-medical: Not on file   Tobacco Use    Smoking status: Current Some Day Smoker     Types: Vaping w/o nicotine    Smokeless tobacco: Never Used   Substance and Sexual Activity    Alcohol use: Yes     Alcohol/week:  "3.0 standard drinks     Types: 3 Glasses of wine per week     Comment: 3x week    Drug use: No    Sexual activity: Not on file   Lifestyle    Physical activity     Days per week: Not on file     Minutes per session: Not on file    Stress: Not on file   Relationships    Social connections     Talks on phone: Not on file     Gets together: Not on file     Attends Church service: Not on file     Active member of club or organization: Not on file     Attends meetings of clubs or organizations: Not on file     Relationship status: Not on file   Other Topics Concern    Not on file   Social History Narrative    Not on file       FAMILY HISTORY:  History reviewed. No pertinent family history.    ALLERGIES AND MEDICATIONS: updated and reviewed.  Review of patient's allergies indicates:  No Known Allergies  No current outpatient medications on file.     No current facility-administered medications for this visit.          ROS  Review of Systems   Cardiovascular: Positive for leg swelling.   Musculoskeletal: Positive for back pain.   Neurological: Positive for numbness.   All other systems reviewed and are negative.        Objective:       Physical Exam  Vitals:    09/30/20 1403   BP: 132/86   BP Location: Left arm   Patient Position: Sitting   BP Method: Large (Manual)   Pulse: 102   Temp: 99.5 °F (37.5 °C)   TempSrc: Oral   SpO2: 98%   Weight: (!) 138.2 kg (304 lb 9.1 oz)   Height: 5' 9" (1.753 m)    Body mass index is 44.98 kg/m².  Weight: (!) 138.2 kg (304 lb 9.1 oz)   Height: 5' 9" (175.3 cm)   Physical Exam  Vitals signs reviewed.   Constitutional:       General: He is not in acute distress.     Appearance: He is well-developed.   HENT:      Head: Normocephalic and atraumatic.      Right Ear: External ear normal.      Left Ear: External ear normal.      Nose: Nose normal.   Eyes:      Pupils: Pupils are equal, round, and reactive to light.   Neck:      Musculoskeletal: Normal range of motion and neck supple.    "   Thyroid: No thyromegaly.   Cardiovascular:      Rate and Rhythm: Normal rate.      Pulses: Normal pulses.   Pulmonary:      Effort: Pulmonary effort is normal.   Musculoskeletal: Normal range of motion.         General: No tenderness.      Right lower leg: Edema (trace) present.      Left lower leg: Edema (trace) present.   Skin:     General: Skin is warm and dry.      Findings: No erythema or rash.   Neurological:      Mental Status: He is alert.      Cranial Nerves: No cranial nerve deficit.      Comments: Positive Tinel's of R hand/wrist   Psychiatric:         Behavior: Behavior normal.             Assessment:     1. Carpal tunnel syndrome of right wrist    2. Right arm numbness    3. Chronic bilateral low back pain without sciatica    4. Obesity, Class III, BMI 40-49.9 (morbid obesity)    5. Swelling of lower extremity    6. Screening for lipid disorders    7. Screening for diabetes mellitus (DM)      Plan:     Bj was seen today for back pain and numbness in rt arm.    Diagnoses and all orders for this visit:    Carpal tunnel syndrome of right wrist  Right arm numbness  Discussed mild symptoms  Utilize wrist brace/support presently  Consider OT if symptoms refractory    Chronic bilateral low back pain without sciatica  No radiculopathy or worrisome findings  Imaging not recommended   Discussed PT   -     Ambulatory referral/consult to Physical/Occupational Therapy; Future  -     CBC auto differential; Future  -     Comprehensive metabolic panel; Future    Obesity, Class III, BMI 40-49.9 (morbid obesity)    Screening for lipid disorders  Screening for diabetes mellitus (DM)  Body mass index is 44.98 kg/m².  We discussed dietary interventions for the management of weight and reduction of risk for chronic metabolic disease  Metabolic labs  -     Lipid Panel; Future  -     Hemoglobin A1C; Future      Swelling of lower extremity  Consider venous insufficiency  Monitor presently        Health Maintenance        Date Due Completion Date    Hepatitis C Screening 1985 ---    Pneumococcal Vaccine (Medium Risk) (1 of 1 - PPSV23) 09/20/2002 ---    Influenza Vaccine (1) 08/01/2020 ---    Lipid Panel 03/25/2024 3/25/2019    TETANUS VACCINE 02/25/2029 2/25/2019            Health Maintenance reviewed, addressed as per orders    Follow up in about 4 weeks (around 10/28/2020) for [facility].    The patient expressed understanding and no barriers to adherence were identified.     1. The patient indicates understanding of these issues and agrees with the plan. Brief care plan is updated and reviewed with the patient as applicable.     2. The patient is given an After Visit Summary that lists all medications with directions, allergies, orders placed during this encounter and follow-up instructions.     3. I have reviewed the patient's medical information including past medical, family, and social history sections including the medications and allergies.     4. We discussed the patient's current medications. I reconciled the patient's medication list and prepared and supplied needed refills.       Stevie Saldana MD  Internal Medicine-Pediatrics

## 2020-09-30 NOTE — PATIENT INSTRUCTIONS
It was a pleasure meeting you today. Myself or a member of my staff will contact you with your lab results    Please call  [phone] to schedule an appointment with Physical/Occupational Therapy ([facility] [address])

## 2020-10-01 PROBLEM — R73.03 PREDIABETES: Status: ACTIVE | Noted: 2020-10-01

## 2020-10-15 ENCOUNTER — CLINICAL SUPPORT (OUTPATIENT)
Dept: REHABILITATION | Facility: HOSPITAL | Age: 35
End: 2020-10-15
Attending: INTERNAL MEDICINE
Payer: COMMERCIAL

## 2020-10-15 DIAGNOSIS — M54.50 CHRONIC BILATERAL LOW BACK PAIN WITHOUT SCIATICA: ICD-10-CM

## 2020-10-15 DIAGNOSIS — G89.29 CHRONIC BILATERAL LOW BACK PAIN WITHOUT SCIATICA: ICD-10-CM

## 2020-10-15 PROCEDURE — 97110 THERAPEUTIC EXERCISES: CPT

## 2020-10-15 PROCEDURE — 97161 PT EVAL LOW COMPLEX 20 MIN: CPT

## 2020-10-15 NOTE — PROGRESS NOTES
[facility]  Physical Therapy Initial Evaluation    Name: Bj Balbuena Jr.  Clinic Number: [id number]    Therapy Diagnosis:   Encounter Diagnosis   Name Primary?    Chronic bilateral low back pain without sciatica      Physician: Stevie Saldana MD    Physician Orders: PT Eval and Treat   Medical Diagnosis from Referral: chronic low back pain  Evaluation Date: 10/15/2020  Authorization Period Expiration: 12/31/20  Plan of Care Expiration: 1/20  Visit # / Visits authorized: 1/20    Time In: 1:35  Time Out: 2:15  Total Billable Time: 40 minutes    Precautions: Standard    Subjective   Date of onset: >3 years  History of current condition - Bj reports: he is having low back pain for years, recently has got worse. Pt states he has been furloughed since march. Pt states he has been home a lot and is having pain with simple tasks.       Medical History:   No past medical history on file.    Surgical History:   Bj Balbuena Jr.  has no past surgical history on file.    Medications:   Bj currently has no medications in their medication list.    Allergies:   Review of patient's allergies indicates:  No Known Allergies     Imaging, none    Prior Therapy: denies  Occupation: Previously construction work  Prior Level of Function: Limited with pain  Current Level of Function: Limited with pain    Pain:  Current 2/10, worst 6/10, best 2/10   Location: bilateral , midline low back  Description: Tight,burning  Aggravating Factors: flexing trunk, walking prolonged distances  Easing Factors: resting    Pts goals: Decrease low back pain with ADLs    Objective     Observation: decrease lumbopelvic movement with ambulation      Lumbar Range of Motion:  Percen   Degrees Pain   Flexion 50   +        Extension 50   +        Left Side Bending 80       Right Side Bending 75         Left rotation   80       Right Rotation   70              Lower Extremity Strength  Right LE  Left LE    Knee  extension (L3): 4+/5 Knee extension (L3): 4+/5   Knee flexion: 4+/5 Knee flexion: 4+/5   Hip flexion (L2): 4+/5 Hip flexion (L2): 4+/5   Hip extension:  4/5 Hip extension: 4/5   Hip abduction: 4+/5 Hip abduction: 4-/5   Hip adduction: 4+/5 Hip adduction: 4+/5   Ankle dorsiflexion (L4): 4+/5 Ankle dorsiflexion (L4): 4+/5         Special Tests:      DTR:   Right Left Comment   Patellar (L3-4) 2+ 2+    Achilles (S1) 2+ 2+        Neuro Dynamic Testing:    Sciatic nerve:      SLR: R = 35 deg     L = 35 deg   Dural slump test:    R = neg    L = neg    Femoral Nerve:    Femoral nerve test: N/T      Joint Mobility:  T11-T12,L1, L2, L3 P/A grade III normal end feel with pain, L4/5 P/A grade III normal end feel some pain.     Palpation: Significant tenderness along T11-L3 paraspinal musculature, gut med tighness/tenderness     Sensation: B intact lower extremity light touch     Flexibility:     Significantly tight bilateral hamstring/adductors         CMS Impairment/Limitation/Restriction for FOTO Oswestry Survey    Therapist reviewed FOTO scores for Bj Balbuena Jr. on 10/15/2020.   FOTO documents entered into [employer] - see Media section.    Limitation Score: 37.9%           TREATMENT   Treatment Time In: 1:35  Treatment Time Out: 2:15  Total Treatment time separate from Evaluation: 40 minutes    Bj received therapeutic exercises to develop strength, ROM and core stabilization for 15 minutes including:  Seated HS stretch 3X30 sec  Active HS stretch 3X30 sec  PPT with TA bracing 20X 2 sec  Piriformis stretch modified 3X30 sec  SKTC 3X30 sec  LTR 20X      Home Exercises and Patient Education Provided    Education provided:   - Role of PT, PT POC    Written Home Exercises Provided: yes.  Exercises were reviewed and Bj was able to demonstrate them prior to the end of the session.  Bj demonstrated good  understanding of the education provided.         Assessment   Bj is a 35 y.o. male referred to outpatient  Physical Therapy with a medical diagnosis of chronic low back pain without sciatica.  Pt presents to physical therapy with the following impairments: postural dysfunction, decreased core and glut max strength, decreased lumbar and LE flexibility and mobility, impaired neuromuscular control of core and hip musculature and pain with functional activities. Pt also demonstrates pain with lower thoracic/lumbar vertebral mobilizations.. Pt is a good candidate for skilled PT services at this time and stands to benefit from a combination of manual therapy including joint mobilizations with trigger point/myofacscial release, therapeutic exercise to establish core/joint stability, neuromuscular re-education, and modalities Prn. Pt has set realistic goals and has verbalized good understanding and agreement with her diagnosis, prognosis and treatment.     Pt prognosis is Good.   Pt will benefit from skilled outpatient Physical Therapy to address the deficits stated above and in the chart below, provide pt/family education, and to maximize pt's level of independence.     Plan of care discussed with patient: Yes  Pt's spiritual, cultural and educational needs considered and patient is agreeable to the plan of care and goals as stated below:     Anticipated Barriers for therapy: none    Medical Necessity is demonstrated by the following  History  Co-morbidities and personal factors that may impact the plan of care Co-morbidities:   Obesity, pre-diabetic    Personal Factors:   no deficits     low   Examination  Body Structures and Functions, activity limitations and participation restrictions that may impact the plan of care Body Regions:   trunk    Body Systems:    ROM  strength  transitions    Participation Restrictions:       Activity limitations:   Learning and applying knowledge  no deficits    General Tasks and Commands  no deficits    Communication  no deficits    Mobility  lifting and carrying objects  walking  moving  around using equipment (WC)  driving (bike, car, motorcycle)    Self care  no deficits    Domestic Life  shopping  cooking  doing house work (cleaning house, washing dishes, laundry)    Interactions/Relationships  no deficits    Life Areas  no deficits    Community and Social Life  no deficits         low   Clinical Presentation stable and uncomplicated low   Decision Making/ Complexity Score: low     Goals:   Short Term Goals (4 Weeks):   1. Pt to achieve minimally clinical important difference in functional outcome measure by re-assessment to demonstrate decrease disability with walking and moving around.  2. Pt to increase strength to 4+/5 of  Glut max muscles  to allow for improvement in functional activities such as performing chores.  3. Pt to improve gross spinal motion in flexion by 25% to allow for improved functional mobility.  4. Pt to tolerate standing for >1 hour with <2/10 pain in low back to allow for improvement in IADLs.  5. Pt to report compliance with HEP 3x/week and demonstrate proper exercise technique to PT to show competence with self management of condition.   6. Pt to demonstrate improved SLR to 50 deg bilateraly    Long Term Goals (8 Weeks):   1. Pt to achieve <20% limitation as measured by the FOTO to demonstrate decreased low back pain disability.  2. Pt to increase strength to 5/5 of  B Glut max to allow for improvement in functional activities such as performing chores.  3. Pt to improve gross spinal motion to 0% limitations to allow for improved functional mobility with performing ADL's  4. Pt to tolerate standing and walking for community distances with <2/10 pain in low back to improve mobility with IADL's.   5. Pt to report compliance with HEP 3x/week and demonstrate proper exercise technique to PT to show independence with self management of condition.      Plan   Plan of care Certification: 10/15/2020 to 12/15/20    Outpatient Physical Therapy 1 times weekly for 8 weeks to  include the following interventions: Manual Therapy, Neuromuscular Re-ed, Patient Education, Therapeutic Activites and Therapeutic Exercise. , Sulaiman Barnard, PT, DPT  10/15/2020

## 2020-10-16 NOTE — PLAN OF CARE
[facility]  Physical Therapy Initial Evaluation    Name: Bj Balbuena Jr.  Clinic Number: [id number]    Therapy Diagnosis:   Encounter Diagnosis   Name Primary?    Chronic bilateral low back pain without sciatica      Physician: Stevie Saldana MD    Physician Orders: PT Eval and Treat   Medical Diagnosis from Referral: chronic low back pain  Evaluation Date: 10/15/2020  Authorization Period Expiration: 12/31/20  Plan of Care Expiration: 1/20  Visit # / Visits authorized: 1/20    Time In: 1:35  Time Out: 2:15  Total Billable Time: 40 minutes    Precautions: Standard    Subjective   Date of onset: >3 years  History of current condition - jB reports: he is having low back pain for years, recently has got worse. Pt states he has been furloughed since march. Pt states he has been home a lot and is having pain with simple tasks.       Medical History:   No past medical history on file.    Surgical History:   Bj Balbuena Jr.  has no past surgical history on file.    Medications:   Bj currently has no medications in their medication list.    Allergies:   Review of patient's allergies indicates:  No Known Allergies     Imaging, none    Prior Therapy: denies  Occupation: Previously construction work  Prior Level of Function: Limited with pain  Current Level of Function: Limited with pain    Pain:  Current 2/10, worst 6/10, best 2/10   Location: bilateral , midline low back  Description: Tight,burning  Aggravating Factors: flexing trunk, walking prolonged distances  Easing Factors: resting    Pts goals: Decrease low back pain with ADLs    Objective     Observation: decrease lumbopelvic movement with ambulation      Lumbar Range of Motion:  Percen   Degrees Pain   Flexion 50   +        Extension 50   +        Left Side Bending 80       Right Side Bending 75         Left rotation   80       Right Rotation   70              Lower Extremity Strength  Right LE  Left LE    Knee  extension (L3): 4+/5 Knee extension (L3): 4+/5   Knee flexion: 4+/5 Knee flexion: 4+/5   Hip flexion (L2): 4+/5 Hip flexion (L2): 4+/5   Hip extension:  4/5 Hip extension: 4/5   Hip abduction: 4+/5 Hip abduction: 4-/5   Hip adduction: 4+/5 Hip adduction: 4+/5   Ankle dorsiflexion (L4): 4+/5 Ankle dorsiflexion (L4): 4+/5         Special Tests:      DTR:   Right Left Comment   Patellar (L3-4) 2+ 2+    Achilles (S1) 2+ 2+        Neuro Dynamic Testing:    Sciatic nerve:      SLR: R = 35 deg     L = 35 deg   Dural slump test:    R = neg    L = neg    Femoral Nerve:    Femoral nerve test: N/T      Joint Mobility:  T11-T12,L1, L2, L3 P/A grade III normal end feel with pain, L4/5 P/A grade III normal end feel some pain.     Palpation: Significant tenderness along T11-L3 paraspinal musculature, gut med tighness/tenderness     Sensation: B intact lower extremity light touch     Flexibility:     Significantly tight bilateral hamstring/adductors         CMS Impairment/Limitation/Restriction for FOTO Oswestry Survey    Therapist reviewed FOTO scores for Bj Balbuena Jr. on 10/15/2020.   FOTO documents entered into [employer] - see Media section.    Limitation Score: 37.9%           TREATMENT   Treatment Time In: 1:35  Treatment Time Out: 2:15  Total Treatment time separate from Evaluation: 40 minutes    Bj received therapeutic exercises to develop strength, ROM and core stabilization for 15 minutes including:  Seated HS stretch 3X30 sec  Active HS stretch 3X30 sec  PPT with TA bracing 20X 2 sec  Piriformis stretch modified 3X30 sec  SKTC 3X30 sec  LTR 20X      Home Exercises and Patient Education Provided    Education provided:   - Role of PT, PT POC    Written Home Exercises Provided: yes.  Exercises were reviewed and Bj was able to demonstrate them prior to the end of the session.  Bj demonstrated good  understanding of the education provided.         Assessment   Bj is a 35 y.o. male referred to outpatient  Physical Therapy with a medical diagnosis of chronic low back pain without sciatica.  Pt presents to physical therapy with the following impairments: postural dysfunction, decreased core and glut max strength, decreased lumbar and LE flexibility and mobility, impaired neuromuscular control of core and hip musculature and pain with functional activities. Pt also demonstrates pain with lower thoracic/lumbar vertebral mobilizations.. Pt is a good candidate for skilled PT services at this time and stands to benefit from a combination of manual therapy including joint mobilizations with trigger point/myofacscial release, therapeutic exercise to establish core/joint stability, neuromuscular re-education, and modalities Prn. Pt has set realistic goals and has verbalized good understanding and agreement with her diagnosis, prognosis and treatment.     Pt prognosis is Good.   Pt will benefit from skilled outpatient Physical Therapy to address the deficits stated above and in the chart below, provide pt/family education, and to maximize pt's level of independence.     Plan of care discussed with patient: Yes  Pt's spiritual, cultural and educational needs considered and patient is agreeable to the plan of care and goals as stated below:     Anticipated Barriers for therapy: none    Medical Necessity is demonstrated by the following  History  Co-morbidities and personal factors that may impact the plan of care Co-morbidities:   Obesity, pre-diabetic    Personal Factors:   no deficits     low   Examination  Body Structures and Functions, activity limitations and participation restrictions that may impact the plan of care Body Regions:   trunk    Body Systems:    ROM  strength  transitions    Participation Restrictions:       Activity limitations:   Learning and applying knowledge  no deficits    General Tasks and Commands  no deficits    Communication  no deficits    Mobility  lifting and carrying objects  walking  moving  around using equipment (WC)  driving (bike, car, motorcycle)    Self care  no deficits    Domestic Life  shopping  cooking  doing house work (cleaning house, washing dishes, laundry)    Interactions/Relationships  no deficits    Life Areas  no deficits    Community and Social Life  no deficits         low   Clinical Presentation stable and uncomplicated low   Decision Making/ Complexity Score: low     Goals:   Short Term Goals (4 Weeks):   1. Pt to achieve minimally clinical important difference in functional outcome measure by re-assessment to demonstrate decrease disability with walking and moving around.  2. Pt to increase strength to 4+/5 of  Glut max muscles  to allow for improvement in functional activities such as performing chores.  3. Pt to improve gross spinal motion in flexion by 25% to allow for improved functional mobility.  4. Pt to tolerate standing for >1 hour with <2/10 pain in low back to allow for improvement in IADLs.  5. Pt to report compliance with HEP 3x/week and demonstrate proper exercise technique to PT to show competence with self management of condition.   6. Pt to demonstrate improved SLR to 50 deg bilateraly    Long Term Goals (8 Weeks):   1. Pt to achieve <20% limitation as measured by the FOTO to demonstrate decreased low back pain disability.  2. Pt to increase strength to 5/5 of  B Glut max to allow for improvement in functional activities such as performing chores.  3. Pt to improve gross spinal motion to 0% limitations to allow for improved functional mobility with performing ADL's  4. Pt to tolerate standing and walking for community distances with <2/10 pain in low back to improve mobility with IADL's.   5. Pt to report compliance with HEP 3x/week and demonstrate proper exercise technique to PT to show independence with self management of condition.      Plan   Plan of care Certification: 10/15/2020 to 12/15/20    Outpatient Physical Therapy 1 times weekly for 8 weeks to  include the following interventions: Manual Therapy, Neuromuscular Re-ed, Patient Education, Therapeutic Activites and Therapeutic Exercise. , Sulaiman Barnard, PT, DPT  10/15/2020

## 2020-10-29 ENCOUNTER — CLINICAL SUPPORT (OUTPATIENT)
Dept: REHABILITATION | Facility: HOSPITAL | Age: 35
End: 2020-10-29
Attending: INTERNAL MEDICINE
Payer: COMMERCIAL

## 2020-10-29 DIAGNOSIS — G89.29 CHRONIC BILATERAL LOW BACK PAIN WITHOUT SCIATICA: Primary | ICD-10-CM

## 2020-10-29 DIAGNOSIS — M54.50 CHRONIC BILATERAL LOW BACK PAIN WITHOUT SCIATICA: Primary | ICD-10-CM

## 2020-10-29 PROCEDURE — 97140 MANUAL THERAPY 1/> REGIONS: CPT

## 2020-10-29 PROCEDURE — 97110 THERAPEUTIC EXERCISES: CPT

## 2020-10-29 NOTE — PROGRESS NOTES
Physical Therapy Daily Treatment Note     Name: Bj Balbuena Jr.  Clinic Number: [id number]    Therapy Diagnosis:   Encounter Diagnosis   Name Primary?    Chronic bilateral low back pain without sciatica Yes     Physician: Stevie Saldana MD    Visit Date: 10/29/2020  Physician Orders: PT Eval and Treat   Medical Diagnosis from Referral: chronic low back pain  Evaluation Date: 10/15/2020  Authorization Period Expiration: 12/31/20  Plan of Care Expiration: 1/21  Visit # / Visits authorized: 2/20    Time In: 12:15 p  Time Out: 12:45 p  Total Billable Time: 30 minutes    Precautions: Standard    Subjective     Pt reports: reports he is doing well. Pt states he was working on a project for his kids and back started aching. Pt states its feeling somewhat better now.   He was compliant with home exercise program.  Response to previous treatment: n/a  Functional change: n/a    Pain: 3/10  Location: low back     Objective     Bj received therapeutic exercises to develop ROM, flexibility and core stabilization for 20 minutes including:  Bike 5 min  Seated HS stretch 3X30 sec  Active HS stretch 3X30 sec  PPT with TA bracing 20X 2 sec  Piriformis stretch modified 3X30 sec  SKTC 3X30 sec  LTR  with SB 3'    Bj received the following manual therapy techniques: Joint mobilizations and Soft tissue Mobilization were applied for 10 minutes, including:  B QL trp release.    Bj participated in neuromuscular re-education activities to improve: for 00 minutes. The following activities were included:      Bj participated in dynamic functional therapeutic activities to improve functional performance for 00  minutes, including:      Bj participated in gait training to improve functional mobility and safety for 00  minutes, including:    Bj received hot pack for 10 minutes to increase circulation and promote tissue healing.      Home Exercises Provided and Patient Education Provided     Education  provided:   -     Written Home Exercises Provided: yes.  Exercises were reviewed and Bj was able to demonstrate them prior to the end of the session.  Bj demonstrated good  understanding of the education provided.       Assessment     Pt tolerated tx well today, presents with neutral pelvic alignment. Pt exhibits significant QL tightness. Good response to MT with improve soft tissue mobility of QL.   Bj is progressing well towards his goals.   Pt prognosis is Good.     Pt will continue to benefit from skilled outpatient physical therapy to address the deficits listed in the problem list box on initial evaluation, provide pt/family education and to maximize pt's level of independence in the home and community environment.     Pt's spiritual, cultural and educational needs considered and pt agreeable to plan of care and goals.    Anticipated barriers to physical therapy: none    Goals:   Short Term Goals (4 Weeks):   1. Pt to achieve minimally clinical important difference in functional outcome measure by re-assessment to demonstrate decrease disability with walking and moving around.  2. Pt to increase strength to 4+/5 of  Glut max muscles  to allow for improvement in functional activities such as performing chores.  3. Pt to improve gross spinal motion in flexion by 25% to allow for improved functional mobility.  4. Pt to tolerate standing for >1 hour with <2/10 pain in low back to allow for improvement in IADLs.  5. Pt to report compliance with HEP 3x/week and demonstrate proper exercise technique to PT to show competence with self management of condition.   6. Pt to demonstrate improved SLR to 50 deg bilateraly     Long Term Goals (8 Weeks):   1. Pt to achieve <20% limitation as measured by the FOTO to demonstrate decreased low back pain disability.  2. Pt to increase strength to 5/5 of  B Glut max to allow for improvement in functional activities such as performing chores.  3. Pt to improve gross  spinal motion to 0% limitations to allow for improved functional mobility with performing ADL's  4. Pt to tolerate standing and walking for community distances with <2/10 pain in low back to improve mobility with IADL's.   5. Pt to report compliance with HEP 3x/week and demonstrate proper exercise technique to PT to show independence with self management of condition.    Plan     Progress core strengthening as tolerated    Roshni Barnard, PT, DPT

## 2020-10-30 ENCOUNTER — OFFICE VISIT (OUTPATIENT)
Dept: FAMILY MEDICINE | Facility: CLINIC | Age: 35
End: 2020-10-30
Payer: COMMERCIAL

## 2020-10-30 VITALS
HEIGHT: 69 IN | WEIGHT: 301.94 LBS | OXYGEN SATURATION: 97 % | DIASTOLIC BLOOD PRESSURE: 80 MMHG | TEMPERATURE: 99 F | SYSTOLIC BLOOD PRESSURE: 132 MMHG | BODY MASS INDEX: 44.72 KG/M2 | HEART RATE: 90 BPM

## 2020-10-30 DIAGNOSIS — E66.01 OBESITY, CLASS III, BMI 40-49.9 (MORBID OBESITY): ICD-10-CM

## 2020-10-30 DIAGNOSIS — G56.01 CARPAL TUNNEL SYNDROME OF RIGHT WRIST: ICD-10-CM

## 2020-10-30 DIAGNOSIS — M54.50 CHRONIC BILATERAL LOW BACK PAIN, UNSPECIFIED WHETHER SCIATICA PRESENT: Primary | ICD-10-CM

## 2020-10-30 DIAGNOSIS — G89.29 CHRONIC BILATERAL LOW BACK PAIN, UNSPECIFIED WHETHER SCIATICA PRESENT: Primary | ICD-10-CM

## 2020-10-30 DIAGNOSIS — R73.03 PREDIABETES: ICD-10-CM

## 2020-10-30 PROCEDURE — 99999 PR PBB SHADOW E&M-EST. PATIENT-LVL III: CPT | Mod: PBBFAC,,, | Performed by: INTERNAL MEDICINE

## 2020-10-30 PROCEDURE — 99214 PR OFFICE/OUTPT VISIT, EST, LEVL IV, 30-39 MIN: ICD-10-PCS | Mod: S$GLB,,, | Performed by: INTERNAL MEDICINE

## 2020-10-30 PROCEDURE — 3008F PR BODY MASS INDEX (BMI) DOCUMENTED: ICD-10-PCS | Mod: CPTII,S$GLB,, | Performed by: INTERNAL MEDICINE

## 2020-10-30 PROCEDURE — 3008F BODY MASS INDEX DOCD: CPT | Mod: CPTII,S$GLB,, | Performed by: INTERNAL MEDICINE

## 2020-10-30 PROCEDURE — 99214 OFFICE O/P EST MOD 30 MIN: CPT | Mod: S$GLB,,, | Performed by: INTERNAL MEDICINE

## 2020-10-30 PROCEDURE — 99999 PR PBB SHADOW E&M-EST. PATIENT-LVL III: ICD-10-PCS | Mod: PBBFAC,,, | Performed by: INTERNAL MEDICINE

## 2020-10-30 RX ORDER — DICLOFENAC SODIUM 50 MG/1
50 TABLET, DELAYED RELEASE ORAL 2 TIMES DAILY PRN
Qty: 60 TABLET | Refills: 0 | Status: SHIPPED | OUTPATIENT
Start: 2020-10-30

## 2020-10-30 NOTE — PATIENT INSTRUCTIONS
Back Care Tips    Caring for your back  These are things you can do to prevent a recurrence of acute back pain and to reduce symptoms from chronic back pain:  · Maintain a healthy weight. If you are overweight, losing weight will help most types of back pain.  · Exercise is an important part of recovery from most types of back pain. The muscles behind and in front of the spine support the back. This means strengthening both the back muscles and the abdominal muscles will provide better support for your spine.   · Swimming and brisk walking are good overall exercises to improve your fitness level.  · Practice safe lifting methods (below).  · Practice good posture when sitting, standing and walking. Avoid prolonged sitting. This puts more stress on the lower back than standing or walking.  · Wear quality shoes with sufficient arch support. Foot and ankle alignment can affect back symptoms. Women should avoid wearing high heels.  · Therapeutic massage can help relax the back muscles without stretching them.  · During the first 24 to 72 hours after an acute injury or flare-up of chronic back pain, apply an ice pack to the painful area for 20 minutes and then remove it for 20 minutes, over a period of 60 to 90 minutes, or several times a day. As a safety precaution, do not use a heating pad at bedtime. Sleeping on a heating pad can lead to skin burns or tissue damage.  · You can alternate ice and heat therapies.  Medications  Talk to your healthcare provider before using medicines, especially if you have other medical problems or are taking other medicines.  · You may use acetaminophen or ibuprofen to control pain, unless your healthcare provider prescribed other pain medicine. If you have chronic conditions like diabetes, liver or kidney disease, stomach ulcers, or gastrointestinal bleeding, or are taking blood thinners, talk with your healthcare provider before taking any medicines.  · Be careful if you are given  prescription pain medicines, narcotics, or medicine for muscle spasm. They can cause drowsiness, affect your coordination, reflexes, and judgment. Do not drive or operate heavy machinery while taking these types of medicines. Take prescription pain medicine only as prescribed by your healthcare provider.  Lumbar stretch  Here is a simple stretching exercise that will help relax muscle spasm and keep your back more limber. If exercise makes your back pain worse, dont do it.  · Lie on your back with your knees bent and both feet on the ground.  · Slowly raise your left knee to your chest as you flatten your lower back against the floor. Hold for 5 seconds.  · Relax and repeat the exercise with your right knee.  · Do 10 of these exercises for each leg.  Safe lifting method  · Dont bend over at the waist to lift an object off the floor.  Instead, bend your knees and hips in a squat.   · Keep your back and head upright  · Hold the object close to your body, directly in front of you.  · Straighten your legs to lift the object.   · Lower the object to the floor in the reverse fashion.  · If you must slide something across the floor, push it.  Posture tips  Sitting  Sit in chairs with straight backs or low-back support. Keep your knees lower than your hips, with your feet flat on the floor.  When driving, sit up straight. Adjust the seat forward so you are not leaning toward the steering wheel.  A small pillow or rolled towel behind your lower back may help if you are driving long distances.   Standing  When standing for long periods, shift most of your weight to one leg at a time. Alternate legs every few minutes.   Sleeping  The best way to sleep is on your side with your knees bent. Put a low pillow under your head to support your neck in a neutral spine position. Avoid thick pillows that bend your neck to one side. Put a pillow between your legs to further relax your lower back. If you sleep on your back, put pillows  under your knees to support your legs in a slightly flexed position. Use a firm mattress. If your mattress sags, replace it, or use a 1/2-inch plywood board under the mattress to add support.  Follow-up care  Follow up with your healthcare provider, or as advised.  If X-rays, a CT scan or an MRI scan were taken, they will be reviewed by a radiologist. You will be notified of any new findings that may affect your care.  Call 911  Seek emergency medical care if any of the following occur:  · Trouble breathing  · Confusion  · Very drowsy  · Fainting or loss of consciousness  · Rapid or very slow heart rate  · Loss of  bowel or bladder control  When to seek medical care  Call your healthcare provider if any of the following occur:  · Pain becomes worse or spreads to your arms or legs  · Weakness or numbness in one or both arms or legs  · Numbness in the groin area  Date Last Reviewed: 6/1/2016  © 8382-0571 [employer] [address] [place] [address], [place] [address]. All rights reserved. This information is not intended as a substitute for professional medical care. Always follow your healthcare professional's instructions.

## 2020-10-30 NOTE — PROGRESS NOTES
Subjective:       Chief Complaint  Chief Complaint   Patient presents with    Follow-up       HPI  Bj Balbuena Jr. is a 35 y.o. male with multiple medical diagnoses as listed in the medical history and problem list that presents for above complaint(s).    Back pain   Has started PT some symptomatic improvement  No radiation to lower extremities or weakness    Patient Care Team:  Stevie Saldana MD as PCP - General (Internal Medicine)  Kami Valera MA as Care Coordinator      PAST MEDICAL HISTORY:  History reviewed. No pertinent past medical history.    PAST SURGICAL HISTORY:  History reviewed. No pertinent surgical history.    SOCIAL HISTORY:  Social History     Socioeconomic History    Marital status:      Spouse name: Not on file    Number of children: Not on file    Years of education: Not on file    Highest education level: Not on file   Occupational History    Not on file   Social Needs    Financial resource strain: Not on file    Food insecurity     Worry: Not on file     Inability: Not on file    Transportation needs     Medical: Not on file     Non-medical: Not on file   Tobacco Use    Smoking status: Current Some Day Smoker     Types: Vaping w/o nicotine    Smokeless tobacco: Never Used   Substance and Sexual Activity    Alcohol use: Yes     Alcohol/week: 3.0 standard drinks     Types: 3 Glasses of wine per week     Comment: 3x week    Drug use: No    Sexual activity: Not on file   Lifestyle    Physical activity     Days per week: Not on file     Minutes per session: Not on file    Stress: Not on file   Relationships    Social connections     Talks on phone: Not on file     Gets together: Not on file     Attends Zoroastrianism service: Not on file     Active member of club or organization: Not on file     Attends meetings of clubs or organizations: Not on file     Relationship status: Not on file   Other Topics Concern    Not on file   Social History Narrative    Not on  "file       FAMILY HISTORY:  History reviewed. No pertinent family history.    ALLERGIES AND MEDICATIONS: updated and reviewed.  Review of patient's allergies indicates:  No Known Allergies  Current Outpatient Medications   Medication Sig Dispense Refill    diclofenac (VOLTAREN) 50 MG EC tablet Take 1 tablet (50 mg total) by mouth 2 (two) times daily as needed (back pain). 60 tablet 0     No current facility-administered medications for this visit.          ROS  Review of Systems   Cardiovascular: Positive for leg swelling.   Musculoskeletal: Positive for back pain.   All other systems reviewed and are negative.        Objective:       Physical Exam  Vitals:    10/30/20 1328   BP: 132/80   BP Location: Left arm   Patient Position: Sitting   BP Method: Large (Manual)   Pulse: 90   Temp: 98.6 °F (37 °C)   TempSrc: Oral   SpO2: 97%   Weight: (!) 137 kg (301 lb 14.7 oz)   Height: 5' 9" (1.753 m)    Body mass index is 44.59 kg/m².  Weight: (!) 137 kg (301 lb 14.7 oz)   Height: 5' 9" (175.3 cm)   Physical Exam  Vitals signs reviewed.   Constitutional:       General: He is not in acute distress.     Appearance: He is well-developed.   HENT:      Head: Normocephalic and atraumatic.      Nose: Nose normal.   Neck:      Musculoskeletal: Normal range of motion and neck supple.      Thyroid: No thyromegaly.   Cardiovascular:      Rate and Rhythm: Normal rate.      Pulses: Normal pulses.   Pulmonary:      Effort: Pulmonary effort is normal.   Musculoskeletal: Normal range of motion.         General: No tenderness.      Comments: Positive SLR bilaterally   Skin:     General: Skin is warm and dry.      Findings: No erythema or rash.   Neurological:      Mental Status: He is alert.      Cranial Nerves: No cranial nerve deficit.   Psychiatric:         Behavior: Behavior normal.             Assessment:     1. Chronic bilateral low back pain, unspecified whether sciatica present    2. Carpal tunnel syndrome of right wrist    3. " Prediabetes    4. Obesity, Class III, BMI 40-49.9 (morbid obesity)      Plan:     Bj was seen today for back pain and numbness in rt arm.    Diagnoses and all orders for this visit:    Carpal tunnel syndrome of right wrist  Right arm numbness  Discussed mild symptoms - continue usage of wrist brace/support presently  Consider OT if symptoms refractory    Chronic bilateral low back pain   No radiculopathy or worrisome findings  Continue PT (started October 2020)    Obesity, Class III, BMI 40-49.9 (morbid obesity)  Body mass index is 44.59 kg/m².      Health Maintenance reviewed, addressed as per orders - Counseled regarding seasonal influenza vaccination - patient declined    Follow up in about 3 months (around 1/30/2021) for [facility] Pain.    The patient expressed understanding and no barriers to adherence were identified.     1. The patient indicates understanding of these issues and agrees with the plan. Brief care plan is updated and reviewed with the patient as applicable.     2. The patient is given an After Visit Summary that lists all medications with directions, allergies, orders placed during this encounter and follow-up instructions.     3. I have reviewed the patient's medical information including past medical, family, and social history sections including the medications and allergies.     4. We discussed the patient's current medications. I reconciled the patient's medication list and prepared and supplied needed refills.       Stevie Saldana MD  Internal Medicine-Pediatrics

## 2020-11-02 ENCOUNTER — CLINICAL SUPPORT (OUTPATIENT)
Dept: REHABILITATION | Facility: HOSPITAL | Age: 35
End: 2020-11-02
Attending: INTERNAL MEDICINE
Payer: COMMERCIAL

## 2020-11-02 DIAGNOSIS — G89.29 CHRONIC BILATERAL LOW BACK PAIN WITHOUT SCIATICA: Primary | ICD-10-CM

## 2020-11-02 DIAGNOSIS — M54.50 CHRONIC BILATERAL LOW BACK PAIN WITHOUT SCIATICA: Primary | ICD-10-CM

## 2020-11-02 PROCEDURE — 97110 THERAPEUTIC EXERCISES: CPT

## 2020-11-02 PROCEDURE — 97140 MANUAL THERAPY 1/> REGIONS: CPT

## 2020-11-02 NOTE — PROGRESS NOTES
Physical Therapy Daily Treatment Note      Name: Bj Balbuena Jr.  Clinic Number: [id number]     Therapy Diagnosis:        Encounter Diagnosis   Name Primary?    Chronic bilateral low back pain without sciatica Yes      Physician: Stevie Saldana MD     Visit Date: 10/29/2020  Physician Orders: PT Eval and Treat   Medical Diagnosis from Referral: chronic low back pain  Evaluation Date: 10/15/2020  Authorization Period Expiration: 12/31/20  Plan of Care Expiration: 1/21  Visit # / Visits authorized: 3/20     Time In: 2:00 p  Time Out: 2:45 p  Total Billable Time: 45 minutes     Precautions: Standard     Subjective      Pt reports: he walked a lot over the weekend and R knee/low back was very painful.   Response to previous treatment: n/a  Functional change: n/a     Pain: 3/10  Location: low back      Objective      Bj received therapeutic exercises to develop ROM, flexibility and core stabilization for 35 minutes including:  Bike 8 min  Seated HS stretch 3X30 sec  Active HS stretch 3X30 sec  PPT with TA bracing 20X 2 sec  Dead bug with SB 2'  Piriformis stretch modified 3X30 sec  SKTC 3X30 sec  LTR  with SB 3'  Bridge 2X10   Standing HS stretch at stairs 3X30 sec  Hip flexor stretch at stairs 3X30 sec    Bj received the following manual therapy techniques: Joint mobilizations and Soft tissue Mobilization were applied for 10 minutes, including:  IaSTM to distal ITB  Assisted R ITB stretch.    Bj participated in neuromuscular re-education activities to improve: for 00 minutes. The following activities were included:        Bj participated in dynamic functional therapeutic activities to improve functional performance for 00  minutes, including:        Bj participated in gait training to improve functional mobility and safety for 00  minutes, including:     Bj received hot pack for 10 minutes to increase circulation and promote tissue healing.        Home Exercises Provided and  Patient Education Provided      Education provided:   -      Written Home Exercises Provided: yes.  Exercises were reviewed and Bj was able to demonstrate them prior to the end of the session.  Bj demonstrated good  understanding of the education provided.         Assessment      Pt tolerated tx well today. Neutral pelvic alignment noted. Pt exhibits significant tightness of R ITB with positive R steff's. Pt completed exercises with few verbal cues.     Bj is progressing well towards his goals.   Pt prognosis is Good.      Pt will continue to benefit from skilled outpatient physical therapy to address the deficits listed in the problem list box on initial evaluation, provide pt/family education and to maximize pt's level of independence in the home and community environment.      Pt's spiritual, cultural and educational needs considered and pt agreeable to plan of care and goals.     Anticipated barriers to physical therapy: none     Goals:   Short Term Goals (4 Weeks):   1. Pt to achieve minimally clinical important difference in functional outcome measure by re-assessment to demonstrate decrease disability with walking and moving around.  2. Pt to increase strength to 4+/5 of  Glut max muscles  to allow for improvement in functional activities such as performing chores.  3. Pt to improve gross spinal motion in flexion by 25% to allow for improved functional mobility.  4. Pt to tolerate standing for >1 hour with <2/10 pain in low back to allow for improvement in IADLs.  5. Pt to report compliance with HEP 3x/week and demonstrate proper exercise technique to PT to show competence with self management of condition.   6. Pt to demonstrate improved SLR to 50 deg bilateraly     Long Term Goals (8 Weeks):   1. Pt to achieve <20% limitation as measured by the FOTO to demonstrate decreased low back pain disability.  2. Pt to increase strength to 5/5 of  B Glut max to allow for improvement in functional  activities such as performing chores.  3. Pt to improve gross spinal motion to 0% limitations to allow for improved functional mobility with performing ADL's  4. Pt to tolerate standing and walking for community distances with <2/10 pain in low back to improve mobility with IADL's.   5. Pt to report compliance with HEP 3x/week and demonstrate proper exercise technique to PT to show independence with self management of condition.     Plan      Progress core strengthening as tolerated, assess response to IASTM of R ITB     Roshni Barnard, PT, DPT

## 2020-11-09 ENCOUNTER — CLINICAL SUPPORT (OUTPATIENT)
Dept: REHABILITATION | Facility: HOSPITAL | Age: 35
End: 2020-11-09
Attending: INTERNAL MEDICINE
Payer: COMMERCIAL

## 2020-11-09 DIAGNOSIS — M54.50 CHRONIC BILATERAL LOW BACK PAIN WITHOUT SCIATICA: Primary | ICD-10-CM

## 2020-11-09 DIAGNOSIS — G89.29 CHRONIC BILATERAL LOW BACK PAIN WITHOUT SCIATICA: Primary | ICD-10-CM

## 2020-11-09 PROCEDURE — 97140 MANUAL THERAPY 1/> REGIONS: CPT

## 2020-11-09 PROCEDURE — 97110 THERAPEUTIC EXERCISES: CPT

## 2020-11-09 NOTE — PROGRESS NOTES
Physical Therapy Daily Treatment Note      Name: Bj Balbuena Jr.  Clinic Number: [id number]     Therapy Diagnosis:           Encounter Diagnosis   Name Primary?    Chronic bilateral low back pain without sciatica Yes      Physician: Stevie Saldana MD     Visit Date: 11/9/2020  Physician Orders: PT Eval and Treat   Medical Diagnosis from Referral: chronic low back pain  Evaluation Date: 10/15/2020  Authorization Period Expiration: 12/31/20  Plan of Care Expiration: 1/21  Visit # / Visits authorized: 4/20     Time In: 2:00 p  Time Out: 2:45 p  Total Billable Time: 45 minutes     Precautions: Standard     Subjective      Pt reports: he walked a lot over the weekend and R knee/low back was very painful.   Response to previous treatment: n/a  Functional change: n/a     Pain: 3/10  Location: low back      Objective      Bj received therapeutic exercises to develop ROM, flexibility and core stabilization for 35 minutes including:  Bike 8 min  Seated HS stretch 3X30 sec  Active HS stretch 3X30 sec NP  Marching with TA bracing 20X 2 sec  Dead bug with SB 2'  Piriformis stretch modified 3X30 sec  SKTC 3X30 sec  LTR  with SB 3' NP  Bridge with OTB 2X10   Standing HS stretch at stairs 1'  Hip flexor stretch at stairs 1'     Bj received the following manual therapy techniques: Joint mobilizations and Soft tissue Mobilization were applied for 10 minutes, including:  SMT/Trp release to Miky glut meds/max     Bj participated in neuromuscular re-education activities to improve: for 00 minutes. The following activities were included:        Bj participated in dynamic functional therapeutic activities to improve functional performance for 00  minutes, including:        Bj participated in gait training to improve functional mobility and safety for 00  minutes, including:     Bj received hot pack for 10 minutes to increase circulation and promote tissue healing.        Home Exercises Provided and  Patient Education Provided      Education provided:   -      Written Home Exercises Provided: yes.  Exercises were reviewed and Bj was able to demonstrate them prior to the end of the session.  Bj demonstrated good  understanding of the education provided.         Assessment      Pt presents with neutral pelvic alignment today. Improved hamstring extensibility noted today bilaterally.  Pt needed some verbal cueing for standing hip flexor stretch, but was able to self correct on opposite side.       Bj is progressing well towards his goals.   Pt prognosis is Good.      Pt will continue to benefit from skilled outpatient physical therapy to address the deficits listed in the problem list box on initial evaluation, provide pt/family education and to maximize pt's level of independence in the home and community environment.      Pt's spiritual, cultural and educational needs considered and pt agreeable to plan of care and goals.     Anticipated barriers to physical therapy: none     Goals:   Short Term Goals (4 Weeks):   1. Pt to achieve minimally clinical important difference in functional outcome measure by re-assessment to demonstrate decrease disability with walking and moving around.  2. Pt to increase strength to 4+/5 of  Glut max muscles  to allow for improvement in functional activities such as performing chores.  3. Pt to improve gross spinal motion in flexion by 25% to allow for improved functional mobility.  4. Pt to tolerate standing for >1 hour with <2/10 pain in low back to allow for improvement in IADLs.  5. Pt to report compliance with HEP 3x/week and demonstrate proper exercise technique to PT to show competence with self management of condition.   6. Pt to demonstrate improved SLR to 50 deg bilateraly     Long Term Goals (8 Weeks):   1. Pt to achieve <20% limitation as measured by the FOTO to demonstrate decreased low back pain disability.  2. Pt to increase strength to 5/5 of  B Glut  max to allow for improvement in functional activities such as performing chores.  3. Pt to improve gross spinal motion to 0% limitations to allow for improved functional mobility with performing ADL's  4. Pt to tolerate standing and walking for community distances with <2/10 pain in low back to improve mobility with IADL's.   5. Pt to report compliance with HEP 3x/week and demonstrate proper exercise technique to PT to show independence with self management of condition.     Plan      Progress core strengthening and hip flexibility exercises as tolerated     Roshni Barnard, PT, DPT

## 2020-11-16 ENCOUNTER — CLINICAL SUPPORT (OUTPATIENT)
Dept: REHABILITATION | Facility: HOSPITAL | Age: 35
End: 2020-11-16
Attending: INTERNAL MEDICINE
Payer: COMMERCIAL

## 2020-11-16 DIAGNOSIS — M54.50 CHRONIC BILATERAL LOW BACK PAIN WITHOUT SCIATICA: ICD-10-CM

## 2020-11-16 DIAGNOSIS — G89.29 CHRONIC BILATERAL LOW BACK PAIN WITHOUT SCIATICA: ICD-10-CM

## 2020-11-16 PROCEDURE — 97112 NEUROMUSCULAR REEDUCATION: CPT | Mod: CQ

## 2020-11-16 PROCEDURE — 97110 THERAPEUTIC EXERCISES: CPT | Mod: CQ

## 2020-11-16 NOTE — PROGRESS NOTES
"Physical Therapy Daily Treatment Note      Name: Bj Balbuena Jr.  Clinic Number: [id number]     Therapy Diagnosis:   Encounter Diagnosis   Name Primary?    Chronic bilateral low back pain without sciatica        Physician: Stevie Saldana MD     Visit Date: 11/9/2020  Physician Orders: PT Eval and Treat   Medical Diagnosis from Referral: M54.5,G89.29 (ICD-10-CM) - Chronic bilateral low back pain without sciatica  Evaluation Date: 10/15/2020  Authorization Period Expiration: 12/31/20  Plan of Care Expiration: 1/21  Visit # / Visits authorized: 5/20     Time In: 1415  Time Out: 1455  Total Billable Time: 38 minutes     Precautions: Standard     Subjective      Pt reports: Pt returns to therapy this pm with c/o min lumbar tightness upon entry. He states he's been active today.     Response to previous treatment: No adverse effects  Functional change: N/A     Pain: 0/10  Location: low back      Objective      Bj received therapeutic exercises to develop ROM, flexibility and core stabilization for 15 minutes including:    Upright bike (lvl 4) x 10' for cardiovascular endurance  SKTC 3 x 20" miky   Standing HS stretch at stairs 3 x 20" miky      Bj received the following manual therapy techniques: Joint mobilizations and Soft tissue Mobilization were applied for 00 minutes, including:  SMT/Trp release to Miky glut meds/max     Bj participated in neuromuscular re-education activities to improve: for 23 minutes. The following activities were included:     Marching bridges x 10  Pallof press BTB 15 x 5" miky   Dead bug with SB 2 x 10 miky      Bj participated in dynamic functional therapeutic activities to improve functional performance for 00  minutes, including:        Bj participated in gait training to improve functional mobility and safety for 00 minutes including:     Bj received hot pack for 00 minutes to increase circulation and promote tissue healing.        Home Exercises Provided " and Patient Education Provided      Education provided:   -      Written Home Exercises Provided: yes.  Exercises were reviewed and Bj was able to demonstrate them prior to the end of the session.  Bj demonstrated good  understanding of the education provided.         Assessment      Pt was able to complete all exercises including progressions with no c/o increased lumbar pain. Pt required v/c for core/glute engagement during marching bridges to avoid lumbar rotation. Core remains weak with fatigue noted during core stabilization exercises. Encouraged continued compliance with HEP; pt voiced understanding. No adverse effects reported p tx.      Bj is progressing well towards his goals.   Pt prognosis is Good.      Pt will continue to benefit from skilled outpatient physical therapy to address the deficits listed in the problem list box on initial evaluation, provide pt/family education and to maximize pt's level of independence in the home and community environment.      Pt's spiritual, cultural and educational needs considered and pt agreeable to plan of care and goals.     Anticipated barriers to physical therapy: none     Goals:   Short Term Goals (4 Weeks):   1. Pt to achieve minimally clinical important difference in functional outcome measure by re-assessment to demonstrate decrease disability with walking and moving around.  2. Pt to increase strength to 4+/5 of  Glut max muscles  to allow for improvement in functional activities such as performing chores.  3. Pt to improve gross spinal motion in flexion by 25% to allow for improved functional mobility.  4. Pt to tolerate standing for >1 hour with <2/10 pain in low back to allow for improvement in IADLs.  5. Pt to report compliance with HEP 3x/week and demonstrate proper exercise technique to PT to show competence with self management of condition.   6. Pt to demonstrate improved SLR to 50 deg bilateraly     Long Term Goals (8 Weeks):   1. Pt  to achieve <20% limitation as measured by the FOTO to demonstrate decreased low back pain disability.  2. Pt to increase strength to 5/5 of  B Glut max to allow for improvement in functional activities such as performing chores.  3. Pt to improve gross spinal motion to 0% limitations to allow for improved functional mobility with performing ADL's  4. Pt to tolerate standing and walking for community distances with <2/10 pain in low back to improve mobility with IADL's.   5. Pt to report compliance with HEP 3x/week and demonstrate proper exercise technique to PT to show independence with self management of condition.     Plan      Progress core strengthening and hip flexibility exercises as tolerated     JAZMINE Lentz

## 2020-11-17 PROBLEM — M54.50 LUMBAGO WITHOUT SCIATICA: Status: ACTIVE | Noted: 2020-11-17

## 2021-12-08 DIAGNOSIS — Z11.59 NEED FOR HEPATITIS C SCREENING TEST: ICD-10-CM

## 2022-05-31 ENCOUNTER — PATIENT MESSAGE (OUTPATIENT)
Dept: ADMINISTRATIVE | Facility: HOSPITAL | Age: 37
End: 2022-05-31
Payer: COMMERCIAL

## 2023-05-23 ENCOUNTER — PATIENT OUTREACH (OUTPATIENT)
Dept: ADMINISTRATIVE | Facility: HOSPITAL | Age: 38
End: 2023-05-23
Payer: COMMERCIAL

## 2023-06-13 ENCOUNTER — PATIENT OUTREACH (OUTPATIENT)
Dept: ADMINISTRATIVE | Facility: HOSPITAL | Age: 38
End: 2023-06-13
Payer: COMMERCIAL

## 2023-06-13 DIAGNOSIS — R73.03 PREDIABETES: ICD-10-CM

## 2023-06-13 DIAGNOSIS — Z11.59 NEED FOR HEPATITIS C SCREENING TEST: Primary | ICD-10-CM

## 2023-07-06 ENCOUNTER — PATIENT MESSAGE (OUTPATIENT)
Dept: FAMILY MEDICINE | Facility: CLINIC | Age: 38
End: 2023-07-06

## 2023-07-06 ENCOUNTER — PATIENT MESSAGE (OUTPATIENT)
Dept: ADMINISTRATIVE | Facility: OTHER | Age: 38
End: 2023-07-06

## 2023-07-06 ENCOUNTER — OFFICE VISIT (OUTPATIENT)
Dept: FAMILY MEDICINE | Facility: CLINIC | Age: 38
End: 2023-07-06

## 2023-07-06 DIAGNOSIS — H10.023 OTHER MUCOPURULENT CONJUNCTIVITIS OF BOTH EYES: Primary | ICD-10-CM

## 2023-07-06 DIAGNOSIS — J06.9 UPPER RESPIRATORY TRACT INFECTION, UNSPECIFIED TYPE: ICD-10-CM

## 2023-07-06 PROCEDURE — 99213 PR OFFICE/OUTPT VISIT, EST, LEVL III, 20-29 MIN: ICD-10-PCS | Mod: 95,,, | Performed by: FAMILY MEDICINE

## 2023-07-06 PROCEDURE — 99213 OFFICE O/P EST LOW 20 MIN: CPT | Mod: 95,,, | Performed by: FAMILY MEDICINE

## 2023-07-06 RX ORDER — POLYMYXIN B SULFATE AND TRIMETHOPRIM 1; 10000 MG/ML; [USP'U]/ML
1 SOLUTION OPHTHALMIC EVERY 6 HOURS
Qty: 1.3334 ML | Refills: 0 | Status: SHIPPED | OUTPATIENT
Start: 2023-07-06 | End: 2023-07-11

## 2023-07-06 NOTE — PROGRESS NOTES
Chief Complaint   Patient presents with    Conjunctivitis       The patient location is:  Patient Home   The chief complaint leading to consultation is: pink eye and cough  Visit type: Virtual visit with synchronous audio and video  Total time spent with patient: 15 min  Each patient to whom he or she provides medical services by telemedicine is:  (1) informed of the relationship between the physician and patient and the respective role of any other health care provider with respect to management of the patient; and (2) notified that he or she may decline to receive medical services by telemedicine and may withdraw from such care at any time.      ASHLEY Balbuena Jr. is a 37 y.o. male with multiple medical diagnoses as listed in the medical history and problem list that presents for evaluation for two days of cough and one day of pink eye    Symptoms started two days ago with cough that is now productive with green phlegm, no sign of wheezing, no fever, not a current smoker, he has also developed redness in both eyes with one being stuck together. No blurred vision, some sensitivity to air but not to light, is not a contact lens wearer    PAST MEDICAL HISTORY:  No past medical history on file.    PAST SURGICAL HISTORY:  No past surgical history on file.    SOCIAL HISTORY:  Social History     Socioeconomic History    Marital status:    Tobacco Use    Smoking status: Some Days     Types: Vaping w/o nicotine    Smokeless tobacco: Never   Substance and Sexual Activity    Alcohol use: Yes     Alcohol/week: 3.0 standard drinks     Types: 3 Glasses of wine per week     Comment: 3x week    Drug use: No       FAMILY HISTORY:  No family history on file.    ALLERGIES AND MEDICATIONS: updated and reviewed.  Review of patient's allergies indicates:  No Known Allergies  Medication List with Changes/Refills   New Medications    POLYMYXIN B SULF-TRIMETHOPRIM (POLYTRIM) 10,000 UNIT- 1 MG/ML DROP    Place 1 drop into  both eyes every 6 (six) hours. for 5 days   Current Medications    DICLOFENAC (VOLTAREN) 50 MG EC TABLET    Take 1 tablet (50 mg total) by mouth 2 (two) times daily as needed (back pain).       ROS  Review of Systems   Constitutional:  Negative for activity change and unexpected weight change.   HENT:  Positive for rhinorrhea. Negative for hearing loss and trouble swallowing.    Eyes:  Positive for discharge. Negative for visual disturbance.   Respiratory:  Negative for chest tightness and wheezing.    Cardiovascular:  Negative for chest pain and palpitations.   Gastrointestinal:  Negative for blood in stool, constipation, diarrhea and vomiting.   Endocrine: Negative for polydipsia and polyuria.   Genitourinary:  Negative for difficulty urinating, hematuria and urgency.   Musculoskeletal:  Negative for arthralgias, joint swelling and neck pain.   Neurological:  Negative for weakness and headaches.   Psychiatric/Behavioral:  Negative for confusion and dysphoric mood.      Physical Exam  There were no vitals filed for this visit. There is no height or weight on file to calculate BMI.            Physical Exam  Vitals and nursing note reviewed.   Constitutional:       Appearance: He is well-developed.   HENT:      Head: Normocephalic and atraumatic.   Eyes:      Extraocular Movements: Extraocular movements intact.      Conjunctiva/sclera:      Right eye: Right conjunctiva is injected.      Left eye: Left conjunctiva is injected.      Comments: Right eyelid swelling   Skin:     General: Skin is warm and dry.      Findings: No rash.   Neurological:      Mental Status: He is alert. Mental status is at baseline.   Psychiatric:         Behavior: Behavior normal.       Health Maintenance         Date Due Completion Date    Hepatitis C Screening Never done ---    COVID-19 Vaccine (1) Never done ---    Pneumococcal Vaccines (Age 0-64) (1 - PCV) Never done ---    Hemoglobin A1c 09/30/2021 9/30/2020    Influenza Vaccine (1)  09/01/2023 ---    Lipid Panel 09/30/2025 9/30/2020    TETANUS VACCINE 02/25/2029 2/25/2019            Health maintenance reviewed and addressed as ordered      ASSESSMENT     1. Other mucopurulent conjunctivitis of both eyes    2. Upper respiratory tract infection, unspecified type        PLAN:     Problem List Items Addressed This Visit    None  Visit Diagnoses       Other mucopurulent conjunctivitis of both eyes    -  Primary  Apply to both eyes tid for 5 days    Relevant Medications    polymyxin B sulf-trimethoprim (POLYTRIM) 10,000 unit- 1 mg/mL Drop    Upper respiratory tract infection, unspecified type      May continue otc cough syrup  Recommend home covid test              Luisa Norwood MD  07/06/2023 8:23 AM        No follow-ups on file.    No orders of the defined types were placed in this encounter.

## 2023-07-18 ENCOUNTER — OCCUPATIONAL HEALTH (OUTPATIENT)
Dept: URGENT CARE | Facility: CLINIC | Age: 38
End: 2023-07-18

## 2023-07-18 DIAGNOSIS — Z02.1 PRE-EMPLOYMENT EXAMINATION: Primary | ICD-10-CM

## 2023-07-18 LAB
CTP QC/QA: YES
POC 10 PANEL DRUG SCREEN: NEGATIVE

## 2023-07-18 PROCEDURE — 99499 PHYSICAL, BASIC COMPLEXITY: ICD-10-PCS | Mod: S$GLB,,, | Performed by: PHYSICIAN ASSISTANT

## 2023-07-18 PROCEDURE — 99499 UNLISTED E&M SERVICE: CPT | Mod: S$GLB,,, | Performed by: PHYSICIAN ASSISTANT

## 2023-07-18 PROCEDURE — 80305 DRUG TEST PRSMV DIR OPT OBS: CPT | Mod: S$GLB,,, | Performed by: PHYSICIAN ASSISTANT

## 2023-07-18 PROCEDURE — 80305 POCT RAPID DRUG SCREEN 10 PANEL: ICD-10-PCS | Mod: S$GLB,,, | Performed by: PHYSICIAN ASSISTANT

## 2025-02-04 ENCOUNTER — LAB VISIT (OUTPATIENT)
Dept: LAB | Facility: HOSPITAL | Age: 40
End: 2025-02-04
Attending: INTERNAL MEDICINE
Payer: COMMERCIAL

## 2025-02-04 ENCOUNTER — OFFICE VISIT (OUTPATIENT)
Dept: FAMILY MEDICINE | Facility: CLINIC | Age: 40
End: 2025-02-04
Payer: COMMERCIAL

## 2025-02-04 VITALS
OXYGEN SATURATION: 98 % | BODY MASS INDEX: 41.11 KG/M2 | HEIGHT: 69 IN | DIASTOLIC BLOOD PRESSURE: 76 MMHG | TEMPERATURE: 99 F | WEIGHT: 277.56 LBS | HEART RATE: 88 BPM | SYSTOLIC BLOOD PRESSURE: 118 MMHG

## 2025-02-04 DIAGNOSIS — E78.5 DYSLIPIDEMIA: ICD-10-CM

## 2025-02-04 DIAGNOSIS — L60.3 DYSTROPHIC NAIL: ICD-10-CM

## 2025-02-04 DIAGNOSIS — E66.01 OBESITY, CLASS III, BMI 40-49.9 (MORBID OBESITY): ICD-10-CM

## 2025-02-04 DIAGNOSIS — Z00.00 ROUTINE ADULT HEALTH MAINTENANCE: Primary | ICD-10-CM

## 2025-02-04 DIAGNOSIS — R73.03 PREDIABETES: ICD-10-CM

## 2025-02-04 DIAGNOSIS — R36.0 PENILE DISCHARGE, WITHOUT BLOOD: ICD-10-CM

## 2025-02-04 DIAGNOSIS — I78.1 SPIDER VEINS: ICD-10-CM

## 2025-02-04 LAB
BILIRUB UR QL STRIP: NEGATIVE
CLARITY UR REFRACT.AUTO: CLEAR
COLOR UR AUTO: YELLOW
GLUCOSE UR QL STRIP: NEGATIVE
HGB UR QL STRIP: NEGATIVE
KETONES UR QL STRIP: NEGATIVE
LEUKOCYTE ESTERASE UR QL STRIP: NEGATIVE
NITRITE UR QL STRIP: NEGATIVE
PH UR STRIP: 7 [PH] (ref 5–8)
PROT UR QL STRIP: NEGATIVE
SP GR UR STRIP: 1.01 (ref 1–1.03)
URN SPEC COLLECT METH UR: NORMAL

## 2025-02-04 PROCEDURE — 99396 PREV VISIT EST AGE 40-64: CPT | Mod: S$GLB,,, | Performed by: INTERNAL MEDICINE

## 2025-02-04 PROCEDURE — 1159F MED LIST DOCD IN RCRD: CPT | Mod: CPTII,S$GLB,, | Performed by: INTERNAL MEDICINE

## 2025-02-04 PROCEDURE — 3078F DIAST BP <80 MM HG: CPT | Mod: CPTII,S$GLB,, | Performed by: INTERNAL MEDICINE

## 2025-02-04 PROCEDURE — 81003 URINALYSIS AUTO W/O SCOPE: CPT | Performed by: INTERNAL MEDICINE

## 2025-02-04 PROCEDURE — 3044F HG A1C LEVEL LT 7.0%: CPT | Mod: CPTII,S$GLB,, | Performed by: INTERNAL MEDICINE

## 2025-02-04 PROCEDURE — 99999 PR PBB SHADOW E&M-EST. PATIENT-LVL III: CPT | Mod: PBBFAC,,, | Performed by: INTERNAL MEDICINE

## 2025-02-04 PROCEDURE — 3008F BODY MASS INDEX DOCD: CPT | Mod: CPTII,S$GLB,, | Performed by: INTERNAL MEDICINE

## 2025-02-04 PROCEDURE — 3074F SYST BP LT 130 MM HG: CPT | Mod: CPTII,S$GLB,, | Performed by: INTERNAL MEDICINE

## 2025-02-04 NOTE — PROGRESS NOTES
Chief Complaint  Chief Complaint   Patient presents with    [facility]  Bj Balbuena Jr. is a 39 y.o. male with multiple medical diagnoses as listed in the medical history and problem list that presents for re-establishment of care.    Patient is feeling well overall and would like to get back into regular care after not having had a physical exam since 2020. Patient noted discharge of seminal fluid following urination after 1-2 weeks without ejaculation. He states that this has been going on for the last 2 years. Patient also noted spider veins on his R lower limb, as well as bilateral dystrophic great toe nails. Patient denies CP, SOB, N/V, constipation, arthralgias or HAs.    PAST MEDICAL HISTORY:  History reviewed. No pertinent past medical history.    PAST SURGICAL HISTORY:  History reviewed. No pertinent surgical history.    SOCIAL HISTORY:  Social History     Socioeconomic History    Marital status:    Tobacco Use    Smoking status: Former     Types: Vaping w/o nicotine     Start date: 01/2016     Quit date: 02/2022     Years since quitting: 3.0    Smokeless tobacco: Never   Substance and Sexual Activity    Alcohol use: Yes     Alcohol/week: 3.0 standard drinks of alcohol     Types: 3 Standard drinks or equivalent per week     Comment: once monthly    Drug use: No    Sexual activity: Yes     Partners: Female     Birth control/protection: Surgical     Comment: 2 kids/boys     Social Drivers of Health     Financial Resource Strain: Low Risk  (2/4/2025)    Overall Financial Resource Strain (CARDIA)     Difficulty of Paying Living Expenses: Not hard at all   Food Insecurity: No Food Insecurity (2/4/2025)    Hunger Vital Sign     Worried About Running Out of Food in the Last Year: Never true     Ran Out of Food in the Last Year: Never true   Physical Activity: Insufficiently Active (2/4/2025)    Exercise Vital Sign     Days of Exercise per Week: 3 days     Minutes of Exercise per  "Session: 20 min   Stress: No Stress Concern Present (2/4/2025)    [employer] [place] [place] - Occupational Stress Questionnaire     Feeling of Stress : Not at all   Housing Stability: Unknown (2/4/2025)    Housing Stability Vital Sign     Unable to Pay for Housing in the Last Year: No     FAMILY HISTORY:  No family history on file.    ALLERGIES AND MEDICATIONS: updated and reviewed.  Review of patient's allergies indicates:  No Known Allergies  Current Outpatient Medications   Medication Sig Dispense Refill    diclofenac (VOLTAREN) 50 MG EC tablet Take 1 tablet (50 mg total) by mouth 2 (two) times daily as needed (back pain). 60 tablet 0     No current facility-administered medications for this visit.     ROS  Review of Systems   HENT:  Negative for congestion.    Eyes:  Negative for discharge.   Respiratory:  Negative for chest tightness and shortness of breath.    Cardiovascular:  Negative for chest pain, palpitations and leg swelling.   Gastrointestinal:  Negative for abdominal pain, constipation, diarrhea, nausea and vomiting.   Genitourinary:  Positive for penile discharge. Negative for difficulty urinating, genital sores and urgency.   Musculoskeletal:  Negative for arthralgias and gait problem.   Skin:  Negative for rash.   Neurological:  Negative for headaches.     Physical Exam  Vitals:    02/04/25 0953   BP: 118/76   Pulse: 88   Temp: 98.5 °F (36.9 °C)   TempSrc: Oral   SpO2: 98%   Weight: 125.9 kg (277 lb 9 oz)   Height: 5' 9" (1.753 m)    Body mass index is 40.99 kg/m².  Weight: 125.9 kg (277 lb 9 oz)   Height: 5' 9" (175.3 cm)     Physical Exam  Vitals reviewed.   Constitutional:       General: He is not in acute distress.     Appearance: Normal appearance.   HENT:      Head: Normocephalic and atraumatic.      Right Ear: Tympanic membrane, ear canal and external ear normal.      Left Ear: Tympanic membrane, ear canal and external ear normal.      Mouth/Throat:      Mouth: Mucous membranes " are moist.      Pharynx: Oropharynx is clear. No oropharyngeal exudate.      Comments: Decay of L upper molars  Neck:      Thyroid: No thyroid mass, thyromegaly or thyroid tenderness.   Cardiovascular:      Rate and Rhythm: Normal rate and regular rhythm.      Heart sounds: No murmur heard.     No gallop.   Pulmonary:      Effort: Pulmonary effort is normal. No respiratory distress.      Breath sounds: Normal breath sounds.   Abdominal:      General: Abdomen is flat. There is no distension.      Palpations: Abdomen is soft. There is no mass.      Tenderness: There is no abdominal tenderness. There is no guarding.   Musculoskeletal:         General: No deformity. Normal range of motion.   Feet:      Right foot:      Toenail Condition: Right toenails are abnormally thick.      Left foot:      Toenail Condition: Left toenails are abnormally thick.      Comments: Dystrophic nails of great toes bilaterally  Skin:     Comments: Spider veins of R posterior calf and R medial posterior ankle   Neurological:      General: No focal deficit present.      Mental Status: He is alert and oriented to person, place, and time.   Psychiatric:         Mood and Affect: Mood normal.         Behavior: Behavior normal.       Health Maintenance         Date Due Completion Date    Hepatitis C Screening Never done ---    Pneumococcal Vaccines (Age 0-49) (1 of 2 - PCV) Never done ---    Hemoglobin A1c 09/30/2021 9/30/2020    Influenza Vaccine (1) Never done ---    COVID-19 Vaccine (1 - 2024-25 season) Never done ---    Lipid Panel 09/30/2025 9/30/2020    TETANUS VACCINE 02/25/2029 2/25/2019    RSV Vaccine (Age 60+ and Pregnant patients) (1 - 1-dose 75+ series) 09/20/2058 ---          Assessment and Plan:    Routine adult health maintenance  Discussed healthy diet, regular exercise, necessary labs, age appropriate cancer screening, and routine vaccinations.    - CBC    Obesity, Class III, BMI 40-49.9 (morbid obesity)  Patient has lost 25 pounds  "since 2020.  - Check Lipid Panel    Prediabetes  Patient's HbA1c from 2020 was 5.7%.  - Check HbA1c  - Check CMP    Dyslipidemia  - Check Lipid Panel    Spider veins  Patient has spider veins present on posterior R calf, as well as R posterior medial ankle.  - Recommended wearing compression stockings and elevating legs at night    Dystrophic nail  - Ambulatory referral/consult to Podiatry    Penile discharge, without blood  Patient describes seminal discharge following urination after not having ejaculation for 1-2 weeks. Patient describes feeling a pressure "in his prostate" when this occurs. Denies any associated pain or burning. Denies rash. Patient is  with no new sexual partners.  - Urinalysis    Mariia Vanegas, MS4    I hereby acknowledge that I am relying upon documentation authored by a medical student working under my supervision and further I hereby attest that I have verified the student documentation or findings by personally performing the physical exam and medical decision making activities of the Evaluation and Management service to be billed.    Visit today included increased complexity associated with the care of the episodic problems addressed and managing the longitudinal care of the patient due to the serious and/or complex managed problem(s) as per assessment/plan.       Stevie Saldana MD    "

## 2025-02-06 ENCOUNTER — PATIENT MESSAGE (OUTPATIENT)
Dept: FAMILY MEDICINE | Facility: CLINIC | Age: 40
End: 2025-02-06
Payer: COMMERCIAL

## 2025-02-11 ENCOUNTER — OFFICE VISIT (OUTPATIENT)
Dept: PODIATRY | Facility: CLINIC | Age: 40
End: 2025-02-11
Payer: COMMERCIAL

## 2025-02-11 VITALS — BODY MASS INDEX: 41.11 KG/M2 | WEIGHT: 277.56 LBS | HEIGHT: 69 IN

## 2025-02-11 DIAGNOSIS — R60.0 BILATERAL EDEMA OF LOWER EXTREMITY: Primary | ICD-10-CM

## 2025-02-11 DIAGNOSIS — L60.3 DYSTROPHIC NAIL: ICD-10-CM

## 2025-02-11 DIAGNOSIS — M72.2 PLANTAR FASCIITIS: ICD-10-CM

## 2025-02-11 DIAGNOSIS — M79.672 LEFT FOOT PAIN: ICD-10-CM

## 2025-02-11 PROCEDURE — 99204 OFFICE O/P NEW MOD 45 MIN: CPT | Mod: S$GLB,,, | Performed by: PODIATRIST

## 2025-02-11 PROCEDURE — 3008F BODY MASS INDEX DOCD: CPT | Mod: CPTII,S$GLB,, | Performed by: PODIATRIST

## 2025-02-11 PROCEDURE — 3044F HG A1C LEVEL LT 7.0%: CPT | Mod: CPTII,S$GLB,, | Performed by: PODIATRIST

## 2025-02-11 PROCEDURE — 1159F MED LIST DOCD IN RCRD: CPT | Mod: CPTII,S$GLB,, | Performed by: PODIATRIST

## 2025-02-11 PROCEDURE — 99999 PR PBB SHADOW E&M-EST. PATIENT-LVL III: CPT | Mod: PBBFAC,,, | Performed by: PODIATRIST

## 2025-02-11 RX ORDER — CICLOPIROX 80 MG/ML
SOLUTION TOPICAL NIGHTLY
Qty: 6.6 ML | Refills: 3 | Status: SHIPPED | OUTPATIENT
Start: 2025-02-11

## 2025-02-12 NOTE — PROGRESS NOTES
Subjective:     Patient ID: Bj Balbuena Jr. is a 39 y.o. male.    Chief Complaint: Nail Problem and Spider Veins    Bj is a 39 y.o. male who presents to the clinic complaining of thick and discolored toenails on both feet. Bj is inquiring   about treatment options.    CC#2- Reports left heel pain and B/L leg swelling    Review of Systems   Constitutional: Negative for chills.   Cardiovascular:  Negative for chest pain and claudication.   Respiratory:  Negative for cough.    Skin:  Positive for color change, dry skin and nail changes.   Musculoskeletal:  Positive for joint pain.   Gastrointestinal:  Negative for nausea.   Neurological:  Positive for paresthesias. Negative for numbness.   Psychiatric/Behavioral:  The patient is not nervous/anxious.         Objective:     Physical Exam  Constitutional:       Appearance: He is well-developed.      Comments: Oriented to time, place, and person.   Cardiovascular:      Comments: DP and PT pulses are palpable bilaterally. 3 sec capillary refill time and toes and feet are warm to touch proximally .  There is  hair growth on the feet and toes b/l. There is no edema b/l. Spider veins noted     Musculoskeletal:      Comments: Equinus noted b/l ankles with < 10 deg DF noted. MMT 5/5 in DF/PF/Inv/Ev resistance with no reproduction of pain in any direction. Passive range of motion of ankle and pedal joints is painless b/l.     Feet:      Right foot:      Skin integrity: No callus or dry skin.      Left foot:      Skin integrity: No callus or dry skin.   Lymphadenopathy:      Comments: Negative lymphadenopathy bilateral popliteal fossa and tarsal tunnel.   Skin:     Comments: Toenails 1-5 bilaterally are elongated by 2-3 mm, thickened by 2-3 mm, discolored/yellowed, dystrophic, brittle with subungual debris.       Neurological:      Mental Status: He is alert.      Comments: Light touch, proprioception, and sharp/dull sensation are all intact bilaterally.  Protective threshold with the [place]Wienstein monofilament is intact bilaterally.    Psychiatric:         Behavior: Behavior is cooperative.         Assessment:      Encounter Diagnoses   Name Primary?    Dystrophic nail     Bilateral edema of lower extremity Yes    Plantar fasciitis     Left foot pain      Plan:     Bj was seen today for nail problem and spider veins.    Diagnoses and all orders for this visit:    Bilateral edema of lower extremity    Dystrophic nail  -     Ambulatory referral/consult to Podiatry    Plantar fasciitis    Left foot pain    Other orders  -     ciclopirox (PENLAC) 8 % Soln; Apply topically nightly.      I counseled the patient on his conditions, their implications and medical management.        At patient's request, I discussed different treatments for toenail fungus. We discussed oral antifungals but I did not recommend them as a first line treatment since the medication is taken internally and can have side effects such as rash, taste disturbances, and liver enzyme elevation. We discussed topical Penlac to be applied daily and removed weekly. Pt. Expresses understanding and would like to try the Penlac. Rx sent to the pharmacy.     Instructed patient on the importance of keeping feet dry. Patient instructed to use absorbent cotton socks and change them if they become sweaty; or wear an open-toe shoe or sandal. Wash the feet at least once a day with soap and water. Patient instructed to use lysol or over-the-counter antifungal powders or sprays to shoes daily and allow them to air dry, switching shoes from every other day would be optimal. Patient is to avoid barefoot walking in high-risk environments (public showers, gyms and locker rooms) may prevent future infections.     CC#2- Plantar fasciitis    Discussed different treatment options for heel pain. including conservative and interventional.  I gave written and verbal instructions on heel cord stretching and this was  demonstrated for the patient. Patient expressed understanding. Discussed wearing appropriate shoe gear and avoiding flats, slippers, sandals, barefoot, and sockfeet. Recommended arch supports. My recommendation for OTC supports is [employer], [employer]ICS tennis shoes.       Patient instructed on adequate icing techniques. Patient should ice the affected area at least once per day x 10 minutes for 10 days . I advised the  patient that extra icing would also be beneficial to ensure adequate anti inflammatory effect     Stretching handout dispensed to patient. Instructions on adequate stretching reviewed in clinic      Discussed the use of compression stockings b/l to help control edema. Tubigrip applied today. Discussed proper and consistent elevation of lower extremities, above the level of the heart, while at rest, to help control/improve edema.

## 2025-02-15 ENCOUNTER — RESULTS FOLLOW-UP (OUTPATIENT)
Dept: FAMILY MEDICINE | Facility: CLINIC | Age: 40
End: 2025-02-15

## 2025-05-19 ENCOUNTER — TELEPHONE (OUTPATIENT)
Dept: FAMILY MEDICINE | Facility: CLINIC | Age: 40
End: 2025-05-19
Payer: COMMERCIAL

## 2025-05-19 NOTE — TELEPHONE ENCOUNTER
Call returned to spouse who verbalizes concerns for patient behavior this weekend in regard to hypo/hyperglycemia. Patient was with lightheadedness and what appeared to be slurred speech. Spouse requested labs and was informed of an in person appointment with provider needed to assess patient. Patient scheduled with 1st available. Confirmation and thank you verbalized.

## 2025-05-19 NOTE — TELEPHONE ENCOUNTER
----- Message from Kami sent at 5/19/2025 10:58 AM CDT -----  Regarding: Aliza(wife)  Caller is requesting to schedule their Lab appointment prior to annual appointment.  Order is not listed in EPIC.  Please enter order and contact patient to schedule.Name of Caller:Aliza(wife)Preferred Date and Time of Labs: As soon as possibleDate of EPP Appointment:Where would they like the lab performed? [place] locationWould the patient rather a call back or a response via My [facility]? callbackBe Call Back Number: [phone] Information:

## 2025-05-20 ENCOUNTER — OFFICE VISIT (OUTPATIENT)
Dept: FAMILY MEDICINE | Facility: CLINIC | Age: 40
End: 2025-05-20
Payer: COMMERCIAL

## 2025-05-20 ENCOUNTER — RESULTS FOLLOW-UP (OUTPATIENT)
Dept: FAMILY MEDICINE | Facility: CLINIC | Age: 40
End: 2025-05-20

## 2025-05-20 ENCOUNTER — LAB VISIT (OUTPATIENT)
Dept: LAB | Facility: HOSPITAL | Age: 40
End: 2025-05-20
Payer: COMMERCIAL

## 2025-05-20 VITALS
SYSTOLIC BLOOD PRESSURE: 124 MMHG | HEIGHT: 69 IN | DIASTOLIC BLOOD PRESSURE: 82 MMHG | TEMPERATURE: 98 F | OXYGEN SATURATION: 99 % | HEART RATE: 73 BPM | BODY MASS INDEX: 39.85 KG/M2 | WEIGHT: 269.06 LBS

## 2025-05-20 DIAGNOSIS — R73.03 PREDIABETES: ICD-10-CM

## 2025-05-20 DIAGNOSIS — G47.33 OSA (OBSTRUCTIVE SLEEP APNEA): Primary | ICD-10-CM

## 2025-05-20 DIAGNOSIS — F51.04 PSYCHOPHYSIOLOGICAL INSOMNIA: ICD-10-CM

## 2025-05-20 DIAGNOSIS — R53.83 FATIGUE, UNSPECIFIED TYPE: Primary | ICD-10-CM

## 2025-05-20 LAB
ABSOLUTE EOSINOPHIL (OHS): 0.06 K/UL
ABSOLUTE MONOCYTE (OHS): 0.55 K/UL (ref 0.3–1)
ABSOLUTE NEUTROPHIL COUNT (OHS): 5.92 K/UL (ref 1.8–7.7)
BASOPHILS # BLD AUTO: 0.06 K/UL
BASOPHILS NFR BLD AUTO: 0.7 %
BILIRUB UR QL STRIP.AUTO: NEGATIVE
CLARITY UR: CLEAR
COLOR UR AUTO: YELLOW
CYCLIC CITRULLINATED PEPTIDE (CCP) (OHS): 0.7 U/ML
EAG (OHS): 111 MG/DL (ref 68–131)
ERYTHROCYTE [DISTWIDTH] IN BLOOD BY AUTOMATED COUNT: 13.3 % (ref 11.5–14.5)
GLUCOSE UR QL STRIP: NEGATIVE
HBA1C MFR BLD: 5.5 % (ref 4–5.6)
HCT VFR BLD AUTO: 44.2 % (ref 40–54)
HCV AB SERPL QL IA: NORMAL
HGB BLD-MCNC: 14.5 GM/DL (ref 14–18)
HGB UR QL STRIP: NEGATIVE
HOLD SPECIMEN: NORMAL
IMM GRANULOCYTES # BLD AUTO: 0.03 K/UL (ref 0–0.04)
IMM GRANULOCYTES NFR BLD AUTO: 0.4 % (ref 0–0.5)
KETONES UR QL STRIP: NEGATIVE
LEUKOCYTE ESTERASE UR QL STRIP: NEGATIVE
LYMPHOCYTES # BLD AUTO: 1.62 K/UL (ref 1–4.8)
MCH RBC QN AUTO: 28.4 PG (ref 27–31)
MCHC RBC AUTO-ENTMCNC: 32.8 G/DL (ref 32–36)
MCV RBC AUTO: 87 FL (ref 82–98)
NITRITE UR QL STRIP: NEGATIVE
NUCLEATED RBC (/100WBC) (OHS): 0 /100 WBC
PH UR STRIP: 7 [PH]
PLATELET # BLD AUTO: 240 K/UL (ref 150–450)
PMV BLD AUTO: 11.5 FL (ref 9.2–12.9)
PROT UR QL STRIP: NEGATIVE
RBC # BLD AUTO: 5.11 M/UL (ref 4.6–6.2)
RELATIVE EOSINOPHIL (OHS): 0.7 %
RELATIVE LYMPHOCYTE (OHS): 19.7 % (ref 18–48)
RELATIVE MONOCYTE (OHS): 6.7 % (ref 4–15)
RELATIVE NEUTROPHIL (OHS): 71.8 % (ref 38–73)
SP GR UR STRIP: 1.01
UROBILINOGEN UR STRIP-ACNC: NEGATIVE EU/DL
VIT B12 SERPL-MCNC: 428 PG/ML (ref 210–950)
WBC # BLD AUTO: 8.24 K/UL (ref 3.9–12.7)

## 2025-05-20 PROCEDURE — 3044F HG A1C LEVEL LT 7.0%: CPT | Mod: CPTII,S$GLB,,

## 2025-05-20 PROCEDURE — 86803 HEPATITIS C AB TEST: CPT

## 2025-05-20 PROCEDURE — 83036 HEMOGLOBIN GLYCOSYLATED A1C: CPT

## 2025-05-20 PROCEDURE — 99999 PR PBB SHADOW E&M-EST. PATIENT-LVL IV: CPT | Mod: PBBFAC,,,

## 2025-05-20 PROCEDURE — 99214 OFFICE O/P EST MOD 30 MIN: CPT | Mod: S$GLB,,,

## 2025-05-20 PROCEDURE — 3074F SYST BP LT 130 MM HG: CPT | Mod: CPTII,S$GLB,,

## 2025-05-20 PROCEDURE — 3008F BODY MASS INDEX DOCD: CPT | Mod: CPTII,S$GLB,,

## 2025-05-20 PROCEDURE — 85025 COMPLETE CBC W/AUTO DIFF WBC: CPT

## 2025-05-20 PROCEDURE — 86200 CCP ANTIBODY: CPT

## 2025-05-20 PROCEDURE — 36415 COLL VENOUS BLD VENIPUNCTURE: CPT | Mod: PO

## 2025-05-20 PROCEDURE — 1160F RVW MEDS BY RX/DR IN RCRD: CPT | Mod: CPTII,S$GLB,,

## 2025-05-20 PROCEDURE — 1159F MED LIST DOCD IN RCRD: CPT | Mod: CPTII,S$GLB,,

## 2025-05-20 PROCEDURE — 82607 VITAMIN B-12: CPT

## 2025-05-20 PROCEDURE — 3079F DIAST BP 80-89 MM HG: CPT | Mod: CPTII,S$GLB,,

## 2025-05-20 PROCEDURE — 81003 URINALYSIS AUTO W/O SCOPE: CPT

## 2025-05-20 PROCEDURE — G2211 COMPLEX E/M VISIT ADD ON: HCPCS | Mod: S$GLB,,,

## 2025-05-20 NOTE — PROGRESS NOTES
HPI     Bj Balbuena Jr. is a 39 y.o. male with multiple medical diagnoses as listed in the medical history and problem list that presents for blood sugar concerns. PCP Dr. Saldana with last visit in this clinic on 2/4/25.     Chief Complaint   Patient presents with    Diabetes     concern     HPI    CHIEF COMPLAINT:  Patient presents with concerns about unusual symptoms, including feeling intoxicated and experiencing mood changes after consuming carbohydrate-heavy foods.    HPI:  Patient reports unusual symptoms after consuming carbohydrate-heavy foods, including feeling and sounding intoxicated without alcohol consumption. This occurred recently at a child's birthday party after eating pasta, sandwiches, and cake. His wife confirms his speech becomes similar to when he has consumed excessive alcohol. Patient also reports fatigue, difficulty sleeping, and feeling warm during these episodes.    A couple of weeks ago, after eating a large number of cookies, patient became suddenly sleepy and had a significant mood change, becoming uncharacteristically aggressive. His wife describes it as a drastic personality shift.    Patient reports occasional slight trembling, excessive thirst and hunger at times. Urinary frequency has increased over the past few years, with the need to urinate every 2 hours during the day and once at night. He notes these are not recent changes but have happened over a few years.    Patient and his wife conducted a self-test on Sunday. He consumed crawfish for supper without high-carb sides and felt more lucid compared to earlier in the day when he had eaten 3 donuts and felt his speech was impaired.    Sleep issues have been ongoing for about 20 years. Patient has difficulty falling asleep due to feeling hot when going to bed. He also reports a second wind of energy late in the evening, typically after 9 PM, regardless of dinner time.    Patient was evaluated by Dr. Saldana in 2020  and told he was pre-diabetic. In a follow-up visit a few months later, his labs was within normal limits. In a recent visit a few months ago, Dr. Saldana noted slightly elevated triglycerides and liver enzymes but did not mention concerns about diabetes.    Patient denies severe trembling episodes, lightheadedness, numbness, recent significant changes in urinary frequency, blood in the stool or urine, constipation, diarrhea, or other GI problems. He also denies weakness on one side of the body or issues with walking.    MEDICAL HISTORY:  Patient has a history of pre-diabetes, diagnosed in 2020. He also has a history of elevated liver enzymes and elevated triglycerides, both noted a few months ago.    SOCIAL HISTORY:  Alcohol: Rarely drinks now, previously heavy drinker Occupation: Works construction  Adventist: [employer]ism  Marital status:        Assessment & Plan     1. Fatigue, unspecified type  2. Prediabetes     Monitored symptoms including feeling intoxicated after carb-heavy foods, fatigue, and mood changes. Neurological exam WNL in clinic today, patient AAOx4.    Explained potential connection between carbohydrate intake and glucose fluctuations.  Suspect patient may be experiencing glucose fluctuations, potentially progressing from prediabetes to diabetes, based on reported symptoms of intoxication-like state after carbohydrate-heavy meals, fatigue, sleep disturbances, and mood changes.   Evaluated patient's alcohol consumption history, noting previous heavy drinking that has decreased to rare consumption currently.   Advised patient to cut out high sugar foods like doughnuts and cakes and continue efforts to reduce overall carbohydrate intake.   Ordered comprehensive labs to assess A1C, thyroid level, B12, and vitamin D to evaluate underlying causes of symptoms.    - CBC Auto Differential; Future  - Comprehensive Metabolic Panel; Future  - Hemoglobin A1C; Future  - Hepatitis C antibody; Future  - Vitamin  B12; Future  - TSH; Future  - T4, Free; Future  - Urinalysis, Reflex to Urine Culture Urine, Clean Catch; Future  - Cyclic Citrullinated Peptide Antibody, IgG; Future    3. Psychophysiological insomnia     Monitored patient's long-standing sleep difficulties, including feeling hot at night and snoring.   Evaluated sleep patterns and potential risk factors for sleep apnea.   Ordered home sleep study to evaluate for potential sleep apnea.   Provided information on relationship between sleep apnea and various health symptoms.    - Home Sleep Study; Future          Discussed DDx, condition, and treatment.   Education sent to patient portal/included in after visit summary.  ED precautions given.   Notify provider if symptoms do not resolve or increase in severity.   Patient verbalizes understanding and agrees with plan of care.  --------------------------------------------      Health Maintenance:  Health Maintenance         Date Due Completion Date    Hepatitis C Screening Never done ---    COVID-19 Vaccine (1 - 2024-25 season) Never done ---    Influenza Vaccine (Season Ended) 09/01/2025 ---    Hemoglobin A1c 02/04/2026 2/4/2025    TETANUS VACCINE 02/25/2029 2/25/2019    Lipid Panel 02/04/2030 2/4/2025    RSV Vaccine (Age 60+ and Pregnant patients) (1 - 1-dose 75+ series) 09/20/2058 ---            Discussed the importance of overdue vaccines which were offered during this encounter. Patient declined overdue vaccines at this time and Advised patient on the importance of completing overdue health maintenance items    Follow Up:  Follow up in about 1 year (around 5/20/2026), or if symptoms worsen or fail to improve.    Exam     Review of Systems:  (as noted above)  Review of Systems   Constitutional:  Positive for fatigue. Negative for fever.   HENT:  Negative for trouble swallowing.    Respiratory:  Negative for shortness of breath.    Cardiovascular:  Negative for chest pain.   Gastrointestinal:  Negative for blood in  "stool.   Psychiatric/Behavioral:  Positive for dysphoric mood.        Physical Exam:   Physical Exam  Constitutional:       General: He is not in acute distress.     Appearance: Normal appearance. He is obese. He is not ill-appearing.   HENT:      Head: Normocephalic and atraumatic.   Cardiovascular:      Rate and Rhythm: Normal rate and regular rhythm.      Pulses: Normal pulses.      Heart sounds: Normal heart sounds. No murmur heard.  Pulmonary:      Effort: Pulmonary effort is normal. No respiratory distress.      Breath sounds: Normal breath sounds. No wheezing.   Abdominal:      General: Abdomen is flat. Bowel sounds are normal.      Palpations: Abdomen is soft.   Musculoskeletal:         General: Normal range of motion.   Skin:     General: Skin is warm and dry.      Capillary Refill: Capillary refill takes less than 2 seconds.   Neurological:      General: No focal deficit present.      Mental Status: He is alert and oriented to person, place, and time. Mental status is at baseline.      GCS: GCS eye subscore is 4. GCS verbal subscore is 5. GCS motor subscore is 6.      Sensory: Sensation is intact.      Motor: Motor function is intact.      Coordination: Coordination is intact.   Psychiatric:         Mood and Affect: Mood normal.         Behavior: Behavior normal.       Vitals:    05/20/25 0836   BP: 124/82   Pulse: 73   Temp: 98.4 °F (36.9 °C)   TempSrc: Oral   SpO2: 99%   Weight: 122 kg (269 lb 1.1 oz)   Height: 5' 9" (1.753 m)      Body mass index is 39.73 kg/m².        History     Past Medical History:  History reviewed. No pertinent past medical history.    Past Surgical History:  History reviewed. No pertinent surgical history.    Social History:  Social History[1]    Family History:  No family history on file.    Allergies and Medications: (updated and reviewed)  Review of patient's allergies indicates:  No Known Allergies  Current Medications[2]    Patient Care Team:  Stevie Saldana MD as PCP - " General (Internal Medicine)         - The patient is given an After Visit Summary that lists all medications with directions, allergies, education, orders placed during this encounter and follow-up instructions.      - I have reviewed the patient's medical information including past medical, family, and social history sections including the medications and allergies.      - We discussed the patient's current medications.     This note was created by combination of typed  and MModal dictation.  Transcription errors may be present.  If there are any questions, please contact me.     This note was generated with the assistance of ambient listening technology. Verbal consent was obtained by the patient and accompanying visitor(s) for the recording of patient appointment to facilitate this note. I attest to having reviewed and edited the generated note for accuracy, though some syntax or spelling errors may persist. Please contact the author of this note for any clarification.                    ASTER Carroll         [1]   Social History  Socioeconomic History    Marital status:    Tobacco Use    Smoking status: Former     Types: Vaping w/o nicotine     Start date: 01/2016     Quit date: 02/2022     Years since quitting: 3.2    Smokeless tobacco: Never   Substance and Sexual Activity    Alcohol use: Yes     Alcohol/week: 3.0 standard drinks of alcohol     Types: 3 Standard drinks or equivalent per week     Comment: once monthly    Drug use: No    Sexual activity: Yes     Partners: Female     Birth control/protection: Surgical     Comment: 2 kids/boys     Social Drivers of Health     Financial Resource Strain: Low Risk  (2/11/2025)    Overall Financial Resource Strain (CARDIA)     Difficulty of Paying Living Expenses: Not hard at all   Food Insecurity: No Food Insecurity (2/11/2025)    Hunger Vital Sign     Worried About Running Out of Food in the Last Year: Never true     Ran Out of Food in the Last  Year: Never true   Transportation Needs: No Transportation Needs (2/11/2025)    PRAPARE - Transportation     Lack of Transportation (Medical): No     Lack of Transportation (Non-Medical): No   Physical Activity: Insufficiently Active (2/11/2025)    Exercise Vital Sign     Days of Exercise per Week: 2 days     Minutes of Exercise per Session: 20 min   Stress: No Stress Concern Present (2/11/2025)    [employer] - Occupational Stress Questionnaire     Feeling of Stress : Not at all   Housing Stability: Low Risk  (2/11/2025)    Housing Stability Vital Sign     Unable to Pay for Housing in the Last Year: No     Number of Times Moved in the Last Year: 0     Homeless in the Last Year: No   [2]   Current Outpatient Medications   Medication Sig Dispense Refill    ciclopirox (PENLAC) 8 % Soln Apply topically nightly. 6.6 mL 3    diclofenac (VOLTAREN) 50 MG EC tablet Take 1 tablet (50 mg total) by mouth 2 (two) times daily as needed (back pain). 60 tablet 0     No current facility-administered medications for this visit.

## 2025-05-29 DIAGNOSIS — R53.83 FATIGUE, UNSPECIFIED TYPE: Primary | ICD-10-CM

## 2025-05-29 NOTE — PROGRESS NOTES
Will re-order labs T4, TSH, CMP. Lab staff to call patient for re-draw as there was an issue with initial processing.

## 2025-06-10 ENCOUNTER — TELEPHONE (OUTPATIENT)
Dept: PULMONOLOGY | Facility: CLINIC | Age: 40
End: 2025-06-10
Payer: COMMERCIAL

## 2025-06-23 ENCOUNTER — HOSPITAL ENCOUNTER (OUTPATIENT)
Dept: SLEEP MEDICINE | Facility: HOSPITAL | Age: 40
Discharge: HOME | End: 2025-06-23
Payer: COMMERCIAL

## 2025-06-23 DIAGNOSIS — F51.04 PSYCHOPHYSIOLOGICAL INSOMNIA: ICD-10-CM

## 2025-06-23 PROCEDURE — 95800 SLP STDY UNATTENDED: CPT

## 2025-06-25 PROBLEM — F51.04 PSYCHOPHYSIOLOGICAL INSOMNIA: Status: ACTIVE | Noted: 2025-06-25

## 2025-06-26 NOTE — PROCEDURES
"Dear Provider,     You have ordered sleep LAB services to perform the sleep study for Bj Balbuena .  The sleep study that you ordered is complete.      Please find Sleep Study result in "Chart Review" under the "Media tab."      As the ordering provider, you are responsible for reviewing the results and implementing a treatment plan with your patient.    If you need a Sleep Medicine provider to explain the sleep study findings and arrange treatment for the patient, please refer patient for consultation to our Sleep Clinic via [facility] with Ambulatory Consult Sleep.    To do that please place an order for an  "Ambulatory Consult Sleep" - it will go to our clinic work queue for our Medical Assistant to contact the patient for an appointment.     For any questions, please contact our clinic staff at [phone] to talk to clinical staff.   "

## 2025-07-02 ENCOUNTER — PATIENT MESSAGE (OUTPATIENT)
Dept: SLEEP MEDICINE | Facility: CLINIC | Age: 40
End: 2025-07-02

## 2025-07-02 ENCOUNTER — OFFICE VISIT (OUTPATIENT)
Dept: SLEEP MEDICINE | Facility: CLINIC | Age: 40
End: 2025-07-02
Payer: COMMERCIAL

## 2025-07-02 VITALS
HEART RATE: 105 BPM | SYSTOLIC BLOOD PRESSURE: 115 MMHG | DIASTOLIC BLOOD PRESSURE: 77 MMHG | HEIGHT: 69 IN | WEIGHT: 271.19 LBS | BODY MASS INDEX: 40.17 KG/M2

## 2025-07-02 DIAGNOSIS — R35.1 NOCTURIA: ICD-10-CM

## 2025-07-02 DIAGNOSIS — G47.33 OSA (OBSTRUCTIVE SLEEP APNEA): Primary | ICD-10-CM

## 2025-07-02 DIAGNOSIS — G47.19 EXCESSIVE DAYTIME SLEEPINESS: ICD-10-CM

## 2025-07-02 DIAGNOSIS — F51.09 OTHER INSOMNIA NOT DUE TO A SUBSTANCE OR KNOWN PHYSIOLOGICAL CONDITION: ICD-10-CM

## 2025-07-02 DIAGNOSIS — R06.83 SNORING: ICD-10-CM

## 2025-07-02 PROCEDURE — 3044F HG A1C LEVEL LT 7.0%: CPT | Mod: CPTII,S$GLB,, | Performed by: PHYSICIAN ASSISTANT

## 2025-07-02 PROCEDURE — 3074F SYST BP LT 130 MM HG: CPT | Mod: CPTII,S$GLB,, | Performed by: PHYSICIAN ASSISTANT

## 2025-07-02 PROCEDURE — 1159F MED LIST DOCD IN RCRD: CPT | Mod: CPTII,S$GLB,, | Performed by: PHYSICIAN ASSISTANT

## 2025-07-02 PROCEDURE — 1160F RVW MEDS BY RX/DR IN RCRD: CPT | Mod: CPTII,S$GLB,, | Performed by: PHYSICIAN ASSISTANT

## 2025-07-02 PROCEDURE — 99999 PR PBB SHADOW E&M-EST. PATIENT-LVL III: CPT | Mod: PBBFAC,,, | Performed by: PHYSICIAN ASSISTANT

## 2025-07-02 PROCEDURE — 3078F DIAST BP <80 MM HG: CPT | Mod: CPTII,S$GLB,, | Performed by: PHYSICIAN ASSISTANT

## 2025-07-02 PROCEDURE — 99204 OFFICE O/P NEW MOD 45 MIN: CPT | Mod: S$GLB,,, | Performed by: PHYSICIAN ASSISTANT

## 2025-07-02 PROCEDURE — 3008F BODY MASS INDEX DOCD: CPT | Mod: CPTII,S$GLB,, | Performed by: PHYSICIAN ASSISTANT

## 2025-07-02 NOTE — PROGRESS NOTES
"Referred by Piper Mckinney, P     NEW PATIENT VISIT    Bj Balbuena Jr.  is a pleasant 39 y.o. male  with PMH significant for PreDM, chronic low back pain, BMI 40+  who presents for sleep evaluation following referral from PCP      C/o snoring, poor disrupted and un-refreshing sleep, and daytime sleepiness and fatigue. States he has been having difficulties with sleep for many years as he typically reports feeling excessively hot when going to bed. Also reports "second wind" around 9PM (regardless of dinner time), which causes him not to feel sleepy until around 11PM. States once sleepy, does not usually have much difficulty initiating sleep, but does endorse sleep disruptions for nocturia, and if plays on his phone or turns the light on, can have some difficulty returning to sleep. Denies sleep walking, does endorse occasional sleep talking. Denies dream enactment behaviors. Denies sx of RLS. States his PCP ordered HST to assess (AHI 8, RDI 22). States he was referred to the sleep clinic to discuss results and recommendations for CAMILLE management, which is why he presents today.        7/2/2025    12:21 PM   Sleep Clinic New Patient   What time do you go to bed on a week day? (Give a range) 9448-5641   What time do you go to bed on a day off? (Give a range) 1674-9754   How long does it take you to fall asleep? (Give a range) 15-20 mins   On average, how many times per night do you wake up? Once, to urinate   How long does it take you to fall back into sleep? (Give a range) Within a minute or two if I avoid artificial light   What time do you wake up to start your day on a week day? (Give a range) 4995-0063   What time do you wake up to start your day on a day off? (Give a range) [phone]   What time do you get out of bed? (Give a range) Within 5-10 minutes of waking   On average, how many hours do you sleep? 5-6   On average, how many naps do you take per day? Zero   Rate your sleep quality from 0 to 5 " (0-poor, 5-great). 3   Have you experienced:  N/a   How much weight have you lost or gained (in lbs.) in the last year? 0   On average, how many times per night do you go to the bathroom?  Once during the night   Have you ever had a sleep study/CPAP machine/surgery for sleep apnea? Yes   Have you ever had a CPAP machine for sleep apnea? No   Have you ever had surgery for sleep apnea? No         7/2/2025    12:21 PM   Sleep Clinic ROS    Difficulty breathing through the nose?  No   Sore throat or dry mouth in the morning? Sometimes   Irregular or very fast heart beat?  No   Shortness of breath?  No   Acid reflux? No   Body aches and pains?  Yes   Morning headaches? No   Dizziness? No   Mood changes?  Sometimes   Do you exercise?  Sometimes   Do you feel like moving your legs a lot?  No       No past medical history on file.  Problem List[1]  Current Medications[2]     There were no vitals filed for this visit.    Physical Exam:    GEN:   Well-appearing  Psych:  Appropriate affect, demonstrates insight  SKIN:  No rash on the face or bridge of the nose      LABS:   Lab Results   Component Value Date    HGB 14.5 05/20/2025    CO2 22 (L) 02/04/2025         RECORDS REVIEWED:    6/23/25 HST: AHI 8, RDI 22, josh 89%    ASSESSMENT        7/2/2025    12:15 PM   EPWORTH SLEEPINESS SCALE   Sitting and reading 2   Watching TV 2   Sitting, inactive in a public place (e.g. a theatre or a meeting) 1   As a passenger in a car for an hour without a break 0   Lying down to rest in the afternoon when circumstances permit 2   Sitting and talking to someone 1   Sitting quietly after a lunch without alcohol 1   In a car, while stopped for a few minutes in traffic 0   Total score 9        Patient-reported       PROBLEM DESCRIPTION/ Sx on Presentation  STATUS   Sx CAMILLE   + snoring, + witnessed apneas  + wakes feeling un-refreshed  Denies AM headaches  HST 2025 AHI 8, RDI 22  New   Daytime Sx   + sleepiness when inactive   ESS 9/24 on intake   New   Insomnia   Trouble falling asleep: 15-20mins  Arousals:         1  Hard to get back to sleep?: within 1-2mins (if avoids artificial light)    Prior pertinent medications:  Current pertinent medications:   New   Nocturia   x 1 per sleep period  New   Other issues:       PLAN      -discussed HST results with patient in detail  -recommended trial of CPAP (gold standard tx for CAMILLE), patient is open  -CPAP and supplies ordered   -discussed CAMILLE and PAP with patient in detail, including possible complications of untreated CAMILLE like heart attack/stroke  -advised on strict driving precautions; advised never to drive drowsy     Advised on plan of care. Answered all patient questions. Patient verbalized understanding and voiced agreement with plan of care.     RTC   will need follow up 31-90 days after receiving CPAP machine for compliance       The patient was given open opportunity to ask questions and/or express concerns about treatment plan. All questions/concerns were discussed.     Two patient identifiers used prior to evaluation.                  [1]   Patient Active Problem List  Diagnosis    Obesity, Class III, BMI 40-49.9 (morbid obesity)    Chronic bilateral low back pain without sciatica    Carpal tunnel syndrome of right wrist    Prediabetes    Lumbago without sciatica    Psychophysiological insomnia   [2]   Current Outpatient Medications:     ciclopirox (PENLAC) 8 % Soln, Apply topically nightly., Disp: 6.6 mL, Rfl: 3

## 2025-07-07 ENCOUNTER — PATIENT MESSAGE (OUTPATIENT)
Dept: SLEEP MEDICINE | Facility: CLINIC | Age: 40
End: 2025-07-07
Payer: COMMERCIAL

## 2025-08-29 ENCOUNTER — PATIENT MESSAGE (OUTPATIENT)
Dept: SLEEP MEDICINE | Facility: CLINIC | Age: 40
End: 2025-08-29
Payer: COMMERCIAL